# Patient Record
Sex: FEMALE | Race: WHITE | Employment: OTHER | URBAN - NONMETROPOLITAN AREA
[De-identification: names, ages, dates, MRNs, and addresses within clinical notes are randomized per-mention and may not be internally consistent; named-entity substitution may affect disease eponyms.]

---

## 2018-01-01 ENCOUNTER — HOSPITAL ENCOUNTER (OUTPATIENT)
Age: 78
Setting detail: SPECIMEN
Discharge: HOME | End: 2018-06-14
Payer: MEDICARE

## 2018-01-01 ENCOUNTER — APPOINTMENT (OUTPATIENT)
Dept: INTERVENTIONAL RADIOLOGY/VASCULAR | Age: 78
End: 2018-01-01
Payer: MEDICARE

## 2018-01-01 ENCOUNTER — APPOINTMENT (OUTPATIENT)
Dept: ULTRASOUND IMAGING | Age: 78
End: 2018-01-01
Payer: MEDICARE

## 2018-01-01 ENCOUNTER — HOSPITAL ENCOUNTER (EMERGENCY)
Age: 78
Discharge: HOME | End: 2018-06-10
Payer: MEDICARE

## 2018-01-01 ENCOUNTER — OFFICE VISIT (OUTPATIENT)
Dept: NEPHROLOGY | Age: 78
End: 2018-01-01
Payer: MEDICARE

## 2018-01-01 ENCOUNTER — APPOINTMENT (OUTPATIENT)
Dept: GENERAL RADIOLOGY | Age: 78
End: 2018-01-01
Payer: MEDICARE

## 2018-01-01 ENCOUNTER — HOSPITAL ENCOUNTER (OUTPATIENT)
Age: 78
Setting detail: SPECIMEN
Discharge: HOME | End: 2018-07-19
Payer: MEDICARE

## 2018-01-01 ENCOUNTER — TELEPHONE (OUTPATIENT)
Dept: INTERNAL MEDICINE CLINIC | Age: 78
End: 2018-01-01

## 2018-01-01 ENCOUNTER — APPOINTMENT (OUTPATIENT)
Dept: ULTRASOUND IMAGING | Age: 78
DRG: 682 | End: 2018-01-01
Payer: MEDICARE

## 2018-01-01 ENCOUNTER — APPOINTMENT (OUTPATIENT)
Dept: GENERAL RADIOLOGY | Age: 78
DRG: 291 | End: 2018-01-01
Payer: MEDICARE

## 2018-01-01 ENCOUNTER — HOSPITAL ENCOUNTER (OUTPATIENT)
Dept: ULTRASOUND IMAGING | Age: 78
Discharge: HOME | End: 2018-07-24
Payer: MEDICARE

## 2018-01-01 ENCOUNTER — HOSPITAL ENCOUNTER (OUTPATIENT)
Age: 78
Setting detail: SPECIMEN
Discharge: HOME | End: 2018-06-12
Payer: MEDICARE

## 2018-01-01 ENCOUNTER — HOSPITAL ENCOUNTER (OUTPATIENT)
Dept: ULTRASOUND IMAGING | Age: 78
Discharge: HOME | End: 2018-08-07
Payer: MEDICARE

## 2018-01-01 ENCOUNTER — OFFICE VISIT (OUTPATIENT)
Dept: INTERNAL MEDICINE CLINIC | Age: 78
End: 2018-01-01
Payer: MEDICARE

## 2018-01-01 ENCOUNTER — HOSPITAL ENCOUNTER (OUTPATIENT)
Dept: ULTRASOUND IMAGING | Age: 78
Discharge: HOME | End: 2018-08-14
Payer: MEDICARE

## 2018-01-01 ENCOUNTER — HOSPITAL ENCOUNTER (OUTPATIENT)
Dept: ULTRASOUND IMAGING | Age: 78
Discharge: HOME | End: 2018-07-31
Payer: MEDICARE

## 2018-01-01 ENCOUNTER — HOSPITAL ENCOUNTER (OUTPATIENT)
Dept: ULTRASOUND IMAGING | Age: 78
Discharge: HOME | DRG: 291 | End: 2018-07-03
Payer: MEDICARE

## 2018-01-01 ENCOUNTER — TELEPHONE (OUTPATIENT)
Dept: PHARMACY | Age: 78
End: 2018-01-01

## 2018-01-01 ENCOUNTER — HOSPITAL ENCOUNTER (OUTPATIENT)
Dept: ULTRASOUND IMAGING | Age: 78
Discharge: HOME | End: 2018-06-26
Payer: MEDICARE

## 2018-01-01 ENCOUNTER — HOSPITAL ENCOUNTER (INPATIENT)
Age: 78
LOS: 9 days | Discharge: HOME HEALTH | DRG: 682 | End: 2018-05-31
Attending: FAMILY MEDICINE | Admitting: INTERNAL MEDICINE
Payer: MEDICARE

## 2018-01-01 ENCOUNTER — HOSPITAL ENCOUNTER (INPATIENT)
Age: 78
LOS: 11 days | Discharge: HOME | DRG: 291 | End: 2018-07-16
Attending: INTERNAL MEDICINE | Admitting: INTERNAL MEDICINE
Payer: MEDICARE

## 2018-01-01 ENCOUNTER — HOSPITAL ENCOUNTER (OUTPATIENT)
Dept: ULTRASOUND IMAGING | Age: 78
Discharge: HOME | End: 2018-06-19
Payer: MEDICARE

## 2018-01-01 ENCOUNTER — APPOINTMENT (OUTPATIENT)
Dept: ULTRASOUND IMAGING | Age: 78
DRG: 291 | End: 2018-01-01
Payer: MEDICARE

## 2018-01-01 ENCOUNTER — HOSPITAL ENCOUNTER (OUTPATIENT)
Dept: ULTRASOUND IMAGING | Age: 78
Discharge: HOME | End: 2018-08-28
Payer: MEDICARE

## 2018-01-01 ENCOUNTER — TELEPHONE (OUTPATIENT)
Dept: NEPHROLOGY | Age: 78
End: 2018-01-01

## 2018-01-01 ENCOUNTER — APPOINTMENT (OUTPATIENT)
Dept: GENERAL RADIOLOGY | Age: 78
DRG: 682 | End: 2018-01-01
Payer: MEDICARE

## 2018-01-01 ENCOUNTER — APPOINTMENT (OUTPATIENT)
Dept: CT IMAGING | Age: 78
DRG: 682 | End: 2018-01-01
Payer: MEDICARE

## 2018-01-01 ENCOUNTER — HOSPITAL ENCOUNTER (EMERGENCY)
Age: 78
Discharge: HOME | End: 2018-06-28
Attending: FAMILY MEDICINE
Payer: MEDICARE

## 2018-01-01 ENCOUNTER — HOSPITAL ENCOUNTER (EMERGENCY)
Age: 78
Discharge: HOME | End: 2018-08-22
Attending: FAMILY MEDICINE
Payer: MEDICARE

## 2018-01-01 ENCOUNTER — HOSPITAL ENCOUNTER (OUTPATIENT)
Dept: ULTRASOUND IMAGING | Age: 78
Discharge: HOME | End: 2018-08-21
Payer: MEDICARE

## 2018-01-01 ENCOUNTER — HOSPITAL ENCOUNTER (EMERGENCY)
Age: 78
Discharge: HOME | End: 2018-06-05
Payer: MEDICARE

## 2018-01-01 VITALS
HEIGHT: 65 IN | HEART RATE: 62 BPM | SYSTOLIC BLOOD PRESSURE: 130 MMHG | BODY MASS INDEX: 29.16 KG/M2 | WEIGHT: 175 LBS | OXYGEN SATURATION: 97 % | DIASTOLIC BLOOD PRESSURE: 60 MMHG

## 2018-01-01 VITALS
HEIGHT: 65 IN | TEMPERATURE: 97.4 F | OXYGEN SATURATION: 100 % | HEART RATE: 60 BPM | RESPIRATION RATE: 14 BRPM | DIASTOLIC BLOOD PRESSURE: 54 MMHG | SYSTOLIC BLOOD PRESSURE: 121 MMHG | BODY MASS INDEX: 26.66 KG/M2 | WEIGHT: 160 LBS

## 2018-01-01 VITALS
WEIGHT: 223.9 LBS | BODY MASS INDEX: 37.3 KG/M2 | OXYGEN SATURATION: 92 % | RESPIRATION RATE: 20 BRPM | SYSTOLIC BLOOD PRESSURE: 112 MMHG | DIASTOLIC BLOOD PRESSURE: 64 MMHG | TEMPERATURE: 97.4 F | HEART RATE: 64 BPM | HEIGHT: 65 IN

## 2018-01-01 VITALS
DIASTOLIC BLOOD PRESSURE: 53 MMHG | BODY MASS INDEX: 36.49 KG/M2 | HEART RATE: 77 BPM | TEMPERATURE: 98.7 F | OXYGEN SATURATION: 97 % | SYSTOLIC BLOOD PRESSURE: 108 MMHG | RESPIRATION RATE: 18 BRPM | HEIGHT: 65 IN | WEIGHT: 219 LBS

## 2018-01-01 VITALS
HEART RATE: 62 BPM | WEIGHT: 168.4 LBS | OXYGEN SATURATION: 96 % | BODY MASS INDEX: 28.06 KG/M2 | TEMPERATURE: 97.4 F | HEIGHT: 65 IN | RESPIRATION RATE: 18 BRPM | DIASTOLIC BLOOD PRESSURE: 61 MMHG | SYSTOLIC BLOOD PRESSURE: 139 MMHG

## 2018-01-01 VITALS
DIASTOLIC BLOOD PRESSURE: 56 MMHG | SYSTOLIC BLOOD PRESSURE: 131 MMHG | HEART RATE: 63 BPM | RESPIRATION RATE: 16 BRPM | TEMPERATURE: 97.5 F | OXYGEN SATURATION: 98 %

## 2018-01-01 VITALS
WEIGHT: 168 LBS | HEART RATE: 60 BPM | OXYGEN SATURATION: 100 % | BODY MASS INDEX: 27.96 KG/M2 | DIASTOLIC BLOOD PRESSURE: 55 MMHG | RESPIRATION RATE: 18 BRPM | SYSTOLIC BLOOD PRESSURE: 134 MMHG | TEMPERATURE: 97.7 F

## 2018-01-01 VITALS — OXYGEN SATURATION: 97 % | SYSTOLIC BLOOD PRESSURE: 142 MMHG | DIASTOLIC BLOOD PRESSURE: 67 MMHG | HEART RATE: 79 BPM

## 2018-01-01 DIAGNOSIS — E11.22 CKD STAGE 3 DUE TO TYPE 2 DIABETES MELLITUS (HCC): ICD-10-CM

## 2018-01-01 DIAGNOSIS — E87.5 HYPERKALEMIA: ICD-10-CM

## 2018-01-01 DIAGNOSIS — K74.60 CIRRHOSIS OF LIVER WITH ASCITES, UNSPECIFIED HEPATIC CIRRHOSIS TYPE (HCC): ICD-10-CM

## 2018-01-01 DIAGNOSIS — E87.5 HYPERKALEMIA: Primary | ICD-10-CM

## 2018-01-01 DIAGNOSIS — J90 PLEURAL EFFUSION: ICD-10-CM

## 2018-01-01 DIAGNOSIS — R19.7 DIARRHEA, UNSPECIFIED TYPE: Primary | ICD-10-CM

## 2018-01-01 DIAGNOSIS — E11.8 TYPE 2 DIABETES MELLITUS WITH COMPLICATION, UNSPECIFIED LONG TERM INSULIN USE STATUS: ICD-10-CM

## 2018-01-01 DIAGNOSIS — N17.9 ACUTE KIDNEY INJURY (HCC): ICD-10-CM

## 2018-01-01 DIAGNOSIS — K74.60 CIRRHOSIS OF LIVER WITHOUT ASCITES, UNSPECIFIED HEPATIC CIRRHOSIS TYPE (HCC): ICD-10-CM

## 2018-01-01 DIAGNOSIS — R41.82 ALTERED MENTAL STATUS, UNSPECIFIED ALTERED MENTAL STATUS TYPE: Primary | ICD-10-CM

## 2018-01-01 DIAGNOSIS — R18.8 CIRRHOSIS OF LIVER WITH ASCITES, UNSPECIFIED HEPATIC CIRRHOSIS TYPE (HCC): ICD-10-CM

## 2018-01-01 DIAGNOSIS — R18.8 OTHER ASCITES: Primary | ICD-10-CM

## 2018-01-01 DIAGNOSIS — R60.1 ANASARCA: ICD-10-CM

## 2018-01-01 DIAGNOSIS — M79.602 LEFT ARM PAIN: Primary | ICD-10-CM

## 2018-01-01 DIAGNOSIS — R60.1 ANASARCA: Primary | ICD-10-CM

## 2018-01-01 DIAGNOSIS — N18.30 CHRONIC KIDNEY DISEASE, STAGE 3 (HCC): Primary | ICD-10-CM

## 2018-01-01 DIAGNOSIS — N18.30 CKD STAGE 3 DUE TO TYPE 2 DIABETES MELLITUS (HCC): ICD-10-CM

## 2018-01-01 DIAGNOSIS — D64.9 ANEMIA, NORMOCYTIC NORMOCHROMIC: ICD-10-CM

## 2018-01-01 DIAGNOSIS — R18.8 OTHER ASCITES: ICD-10-CM

## 2018-01-01 DIAGNOSIS — N18.4 STAGE 4 CHRONIC KIDNEY DISEASE (HCC): ICD-10-CM

## 2018-01-01 DIAGNOSIS — K74.60 HEPATIC CIRRHOSIS, UNSPECIFIED HEPATIC CIRRHOSIS TYPE, UNSPECIFIED WHETHER ASCITES PRESENT (HCC): ICD-10-CM

## 2018-01-01 DIAGNOSIS — I10 ESSENTIAL HYPERTENSION: ICD-10-CM

## 2018-01-01 DIAGNOSIS — K74.69 FLORID CIRRHOSIS (HCC): ICD-10-CM

## 2018-01-01 DIAGNOSIS — R77.0 ABNORMALITY OF ALBUMIN: ICD-10-CM

## 2018-01-01 LAB
ABO: NORMAL
ABSOLUTE BASO #: 100 /CMM (ref 0–200)
ABSOLUTE EOS #: 200 /CMM (ref 0–500)
ABSOLUTE LYMPH #: 1000 /CMM (ref 1000–4800)
ABSOLUTE MONO #: 800 /CMM (ref 0–800)
ABSOLUTE NEUT #: 4900 /CMM (ref 1800–7700)
AFP-TUMOR MARKER: 2.3 UG/L
ALBUMIN FLUID: 1.3 GM/DL
ALBUMIN FLUID: 1.5 GM/DL
ALBUMIN FLUID: 1.8 GM/DL
ALBUMIN FLUID: 2.3 GM/DL
ALBUMIN FLUID: 2.3 GM/DL
ALBUMIN SERPL-MCNC: 2.5 G/DL (ref 3.5–5.1)
ALBUMIN SERPL-MCNC: 2.7 G/DL (ref 3.5–5.1)
ALBUMIN SERPL-MCNC: 2.8 G/DL (ref 3.5–5.1)
ALBUMIN SERPL-MCNC: 2.8 G/DL (ref 3.5–5.1)
ALBUMIN SERPL-MCNC: 2.9 G/DL (ref 3.5–5.1)
ALBUMIN SERPL-MCNC: 3.1 G/DL (ref 3.5–5.1)
ALBUMIN SERPL-MCNC: 3.5 G/DL (ref 3.5–5.1)
ALBUMIN SERPL-MCNC: 3.7 G/DL (ref 3.5–5.1)
ALLEN TEST: POSITIVE
ALP BLD-CCNC: 136 U/L (ref 38–126)
ALP BLD-CCNC: 153 U/L (ref 38–126)
ALP BLD-CCNC: 154 U/L (ref 38–126)
ALP BLD-CCNC: 195 U/L (ref 38–126)
ALP BLD-CCNC: 261 U/L (ref 38–126)
ALP BLD-CCNC: 61 U/L (ref 38–126)
ALP BLD-CCNC: 78 U/L (ref 38–126)
ALP BLD-CCNC: 82 U/L (ref 38–126)
ALP BLD-CCNC: 96 U/L (ref 38–126)
ALT SERPL-CCNC: 10 U/L (ref 11–66)
ALT SERPL-CCNC: 12 U/L (ref 11–66)
ALT SERPL-CCNC: 16 U/L (ref 11–66)
ALT SERPL-CCNC: 7 U/L (ref 11–66)
ALT SERPL-CCNC: 9 U/L (ref 11–66)
ALT SERPL-CCNC: 9 U/L (ref 11–66)
AMMONIA: 20 UMOL/L (ref 11–60)
AMMONIA: 31 UMOL/L (ref 11–60)
AMMONIA: 54 UMOL/L (ref 11–60)
AMMONIA: 66 UMOL/L (ref 11–60)
AMMONIA: 78 UMOL/L (ref 11–60)
AMMONIA: 92 UMOL/L (ref 11–60)
AMYLASE FLUID: 27 U/L
ANA SCREEN, REFLEXED: ABNORMAL
ANA SCREEN: DETECTED
ANAEROBIC CULTURE: NORMAL
ANION GAP SERPL CALCULATED.3IONS-SCNC: 10 MEQ/L (ref 8–16)
ANION GAP SERPL CALCULATED.3IONS-SCNC: 10 MEQ/L (ref 8–16)
ANION GAP SERPL CALCULATED.3IONS-SCNC: 11 MEQ/L (ref 8–16)
ANION GAP SERPL CALCULATED.3IONS-SCNC: 12 MEQ/L (ref 8–16)
ANION GAP SERPL CALCULATED.3IONS-SCNC: 13 MEQ/L (ref 8–16)
ANION GAP SERPL CALCULATED.3IONS-SCNC: 14 MEQ/L (ref 8–16)
ANION GAP SERPL CALCULATED.3IONS-SCNC: 15 MEQ/L (ref 8–16)
ANION GAP SERPL CALCULATED.3IONS-SCNC: 17 MEQ/L (ref 8–16)
ANION GAP SERPL CALCULATED.3IONS-SCNC: 2 MMOL/L (ref 4–12)
ANION GAP SERPL CALCULATED.3IONS-SCNC: 8 MEQ/L (ref 8–16)
ANION GAP SERPL CALCULATED.3IONS-SCNC: 8 MEQ/L (ref 8–16)
ANISOCYTOSIS: ABNORMAL
ANISOCYTOSIS: PRESENT
ANTIBODY SCREEN: NORMAL
APTT: 33.2 SECONDS (ref 22–38)
AST SERPL-CCNC: 12 U/L (ref 5–40)
AST SERPL-CCNC: 12 U/L (ref 5–40)
AST SERPL-CCNC: 14 U/L (ref 5–40)
AST SERPL-CCNC: 15 U/L (ref 5–40)
AST SERPL-CCNC: 16 U/L (ref 5–40)
AST SERPL-CCNC: 16 U/L (ref 5–40)
AST SERPL-CCNC: 19 U/L (ref 5–40)
AST SERPL-CCNC: 19 U/L (ref 5–40)
AST SERPL-CCNC: 25 U/L (ref 5–40)
AVERAGE GLUCOSE: 192 MG/DL (ref 70–126)
BACTERIA: ABNORMAL /HPF
BASE EXCESS (CALCULATED): 12.6 MMOL/L (ref -2.5–2.5)
BASO FLUID: 1 % (ref 0–3)
BASOPHILS # BLD: 0.1 %
BASOPHILS # BLD: 0.2 %
BASOPHILS # BLD: 0.4 %
BASOPHILS # BLD: 0.4 %
BASOPHILS # BLD: 0.5 %
BASOPHILS ABSOLUTE: 0 THOU/MM3 (ref 0–0.1)
BASOPHILS ABSOLUTE: ABNORMAL /ΜL
BASOPHILS RELATIVE PERCENT: 0.8 % (ref 0–2)
BASOPHILS RELATIVE PERCENT: ABNORMAL %
BILIRUB SERPL-MCNC: 0.5 MG/DL (ref 0.3–1.2)
BILIRUB SERPL-MCNC: 0.6 MG/DL (ref 0.3–1.2)
BILIRUB SERPL-MCNC: 0.8 MG/DL (ref 0.3–1.2)
BILIRUB SERPL-MCNC: 1 MG/DL (ref 0.3–1.2)
BILIRUB SERPL-MCNC: 1.2 MG/DL (ref 0.3–1.2)
BILIRUB SERPL-MCNC: 1.4 MG/DL (ref 0.3–1.2)
BILIRUB SERPL-MCNC: 1.4 MG/DL (ref 0.3–1.2)
BILIRUB SERPL-MCNC: 1.5 MG/DL (ref 0.3–1.2)
BILIRUB SERPL-MCNC: 1.7 MG/DL (ref 0.3–1.2)
BILIRUBIN DIRECT: 0.3 MG/DL (ref 0–0.3)
BILIRUBIN DIRECT: 0.5 MG/DL (ref 0–0.3)
BILIRUBIN DIRECT: 0.8 MG/DL (ref 0–0.3)
BILIRUBIN DIRECT: 0.9 MG/DL (ref 0–0.3)
BILIRUBIN DIRECT: 0.9 MG/DL (ref 0–0.3)
BILIRUBIN URINE: NEGATIVE
BLOOD CULTURE, ROUTINE: NORMAL
BLOOD, URINE: NEGATIVE
BODY FLUID CULTURE, STERILE: NORMAL
BODY FLUID RBC: 2000 /CUMM
BODY FLUID RBC: 3000 /CUMM
BODY FLUID RBC: 7000 /CUMM
BODY FLUID RBC: < 2000 /CUMM
BUN BLDV-MCNC: 101 MG/DL (ref 7–22)
BUN BLDV-MCNC: 106 MG/DL (ref 7–22)
BUN BLDV-MCNC: 109 MG/DL (ref 7–22)
BUN BLDV-MCNC: 30 MG/DL (ref 7–22)
BUN BLDV-MCNC: 34 MG/DL (ref 7–22)
BUN BLDV-MCNC: 34 MG/DL (ref 7–22)
BUN BLDV-MCNC: 35 MG/DL (ref 7–22)
BUN BLDV-MCNC: 36 MG/DL (ref 7–22)
BUN BLDV-MCNC: 37 MG/DL (ref 7–20)
BUN BLDV-MCNC: 41 MG/DL
BUN BLDV-MCNC: 45 MG/DL (ref 7–22)
BUN BLDV-MCNC: 46 MG/DL (ref 7–22)
BUN BLDV-MCNC: 46 MG/DL (ref 7–22)
BUN BLDV-MCNC: 52 MG/DL (ref 7–22)
BUN BLDV-MCNC: 55 MG/DL (ref 7–22)
BUN BLDV-MCNC: 57 MG/DL (ref 7–22)
BUN BLDV-MCNC: 58 MG/DL (ref 7–22)
BUN BLDV-MCNC: 58 MG/DL (ref 7–22)
BUN BLDV-MCNC: 59 MG/DL (ref 7–22)
BUN BLDV-MCNC: 63 MG/DL (ref 7–22)
BUN BLDV-MCNC: 65 MG/DL (ref 7–22)
BUN BLDV-MCNC: 69 MG/DL (ref 7–22)
BUN BLDV-MCNC: 72 MG/DL (ref 7–22)
BUN BLDV-MCNC: 72 MG/DL (ref 7–22)
BUN BLDV-MCNC: 78 MG/DL (ref 7–22)
BUN BLDV-MCNC: 88 MG/DL (ref 7–22)
BUN BLDV-MCNC: 90 MG/DL (ref 7–22)
BUN BLDV-MCNC: 91 MG/DL (ref 7–22)
BUN BLDV-MCNC: 93 MG/DL (ref 7–22)
BUN BLDV-MCNC: 97 MG/DL (ref 7–22)
BUN BLDV-MCNC: 98 MG/DL (ref 7–22)
CA 125: 586 U/ML
CALCIUM SERPL-MCNC: 7.8 MG/DL (ref 8.5–10.5)
CALCIUM SERPL-MCNC: 8 MG/DL (ref 8.5–10.5)
CALCIUM SERPL-MCNC: 8 MG/DL (ref 8.8–10.5)
CALCIUM SERPL-MCNC: 8.1 MG/DL (ref 8.5–10.5)
CALCIUM SERPL-MCNC: 8.1 MG/DL (ref 8.5–10.5)
CALCIUM SERPL-MCNC: 8.2 MG/DL (ref 8.5–10.5)
CALCIUM SERPL-MCNC: 8.2 MG/DL (ref 8.5–10.5)
CALCIUM SERPL-MCNC: 8.3 MG/DL
CALCIUM SERPL-MCNC: 8.3 MG/DL (ref 8.5–10.5)
CALCIUM SERPL-MCNC: 8.4 MG/DL (ref 8.5–10.5)
CALCIUM SERPL-MCNC: 8.5 MG/DL (ref 8.5–10.5)
CALCIUM SERPL-MCNC: 8.6 MG/DL (ref 8.5–10.5)
CALCIUM SERPL-MCNC: 8.7 MG/DL (ref 8.5–10.5)
CALCIUM SERPL-MCNC: 8.8 MG/DL (ref 8.5–10.5)
CALCIUM SERPL-MCNC: 8.9 MG/DL (ref 8.5–10.5)
CASTS 2: ABNORMAL /LPF
CASTS UA: ABNORMAL /LPF
CEA: 3.6 NG/ML (ref 0–5)
CHARACTER, BODY FLUID: ABNORMAL
CHARACTER, BODY FLUID: NORMAL
CHARACTER, URINE: ABNORMAL
CHARACTER, URINE: ABNORMAL
CHARACTER, URINE: CLEAR
CHLORIDE BLD-SCNC: 100 MEQ/L (ref 98–111)
CHLORIDE BLD-SCNC: 101 MEQ/L (ref 98–111)
CHLORIDE BLD-SCNC: 102 MEQ/L (ref 98–111)
CHLORIDE BLD-SCNC: 102 MEQ/L (ref 98–111)
CHLORIDE BLD-SCNC: 102 MMOL/L
CHLORIDE BLD-SCNC: 105 MEQ/L (ref 98–111)
CHLORIDE BLD-SCNC: 107 MEQ/L (ref 101–111)
CHLORIDE BLD-SCNC: 107 MEQ/L (ref 98–111)
CHLORIDE BLD-SCNC: 109 MEQ/L (ref 98–111)
CHLORIDE BLD-SCNC: 82 MEQ/L (ref 98–111)
CHLORIDE BLD-SCNC: 87 MEQ/L (ref 98–111)
CHLORIDE BLD-SCNC: 88 MEQ/L (ref 98–111)
CHLORIDE BLD-SCNC: 88 MEQ/L (ref 98–111)
CHLORIDE BLD-SCNC: 89 MEQ/L (ref 98–111)
CHLORIDE BLD-SCNC: 89 MEQ/L (ref 98–111)
CHLORIDE BLD-SCNC: 90 MEQ/L (ref 98–111)
CHLORIDE BLD-SCNC: 90 MEQ/L (ref 98–111)
CHLORIDE BLD-SCNC: 91 MEQ/L (ref 98–111)
CHLORIDE BLD-SCNC: 92 MEQ/L (ref 98–111)
CHLORIDE BLD-SCNC: 92 MEQ/L (ref 98–111)
CHLORIDE BLD-SCNC: 93 MEQ/L (ref 98–111)
CHLORIDE BLD-SCNC: 93 MEQ/L (ref 98–111)
CHLORIDE BLD-SCNC: 95 MEQ/L (ref 98–111)
CHLORIDE BLD-SCNC: 97 MEQ/L (ref 98–111)
CHLORIDE BLD-SCNC: 97 MEQ/L (ref 98–111)
CHLORIDE, URINE: 31.8 MEQ/L
CHP ED QC CHECK: YES
CO2: 16 MEQ/L (ref 23–33)
CO2: 17 MEQ/L (ref 23–33)
CO2: 19 MEQ/L (ref 23–33)
CO2: 19 MEQ/L (ref 23–33)
CO2: 20 MEQ/L (ref 23–33)
CO2: 20 MEQ/L (ref 23–33)
CO2: 22 MEQ/L (ref 21–32)
CO2: 22 MEQ/L (ref 23–33)
CO2: 24 MEQ/L (ref 23–33)
CO2: 24 MEQ/L (ref 23–33)
CO2: 25 MEQ/L (ref 23–33)
CO2: 26 MEQ/L (ref 23–33)
CO2: 27 MEQ/L (ref 23–33)
CO2: 28 MMOL/L
CO2: 29 MEQ/L (ref 23–33)
CO2: 31 MEQ/L (ref 23–33)
CO2: 33 MEQ/L (ref 23–33)
CO2: 34 MEQ/L (ref 23–33)
CO2: 36 MEQ/L (ref 23–33)
CO2: 36 MEQ/L (ref 23–33)
CO2: 37 MEQ/L (ref 23–33)
CO2: 38 MEQ/L (ref 23–33)
COLLECTED BY:: 4648
COLOR: ABNORMAL
COLOR: ABNORMAL
COLOR: YELLOW
CREAT SERPL-MCNC: 1.1 MG/DL (ref 0.4–1.2)
CREAT SERPL-MCNC: 1.12 MG/DL (ref 0.6–1.3)
CREAT SERPL-MCNC: 1.2 MG/DL (ref 0.4–1.2)
CREAT SERPL-MCNC: 1.3 MG/DL (ref 0.4–1.2)
CREAT SERPL-MCNC: 1.4 MG/DL
CREAT SERPL-MCNC: 1.4 MG/DL (ref 0.4–1.2)
CREAT SERPL-MCNC: 1.5 MG/DL (ref 0.4–1.2)
CREAT SERPL-MCNC: 1.6 MG/DL (ref 0.4–1.2)
CREAT SERPL-MCNC: 1.7 MG/DL (ref 0.4–1.2)
CREAT SERPL-MCNC: 1.8 MG/DL (ref 0.4–1.2)
CREAT SERPL-MCNC: 1.9 MG/DL (ref 0.4–1.2)
CREAT SERPL-MCNC: 2 MG/DL (ref 0.4–1.2)
CREAT SERPL-MCNC: 2.1 MG/DL (ref 0.4–1.2)
CREAT SERPL-MCNC: 2.1 MG/DL (ref 0.4–1.2)
CREAT SERPL-MCNC: 2.3 MG/DL (ref 0.4–1.2)
CREAT SERPL-MCNC: 2.5 MG/DL (ref 0.4–1.2)
CREAT SERPL-MCNC: 2.6 MG/DL (ref 0.4–1.2)
CREAT SERPL-MCNC: 2.7 MG/DL (ref 0.4–1.2)
CREATININE CLEARANCE: 47
CREATININE URINE: 49 MG/DL
CRENATED RBC'S: ABNORMAL
CRYSTALS, UA: ABNORMAL
CYCLIC CITRULLIN PEPTIDE AB: 2 UNITS (ref 0–19)
DEVICE: ABNORMAL
DIGOXIN LEVEL: 0.8 NG/ML (ref 0.5–2)
DIGOXIN LEVEL: 1.2 NG/ML (ref 0.5–2)
DIGOXIN LEVEL: 1.8 NG/ML (ref 0.5–2)
DIGOXIN LEVEL: 2.3 NG/ML (ref 0.5–2)
EKG ATRIAL RATE: 125 BPM
EKG ATRIAL RATE: 241 BPM
EKG ATRIAL RATE: 60 BPM
EKG ATRIAL RATE: 60 BPM
EKG ATRIAL RATE: 62 BPM
EKG ATRIAL RATE: 87 BPM
EKG P AXIS: 69 DEGREES
EKG P AXIS: 72 DEGREES
EKG P AXIS: 79 DEGREES
EKG P-R INTERVAL: 188 MS
EKG P-R INTERVAL: 268 MS
EKG P-R INTERVAL: 272 MS
EKG Q-T INTERVAL: 390 MS
EKG Q-T INTERVAL: 434 MS
EKG Q-T INTERVAL: 438 MS
EKG Q-T INTERVAL: 440 MS
EKG Q-T INTERVAL: 456 MS
EKG Q-T INTERVAL: 470 MS
EKG QRS DURATION: 124 MS
EKG QRS DURATION: 130 MS
EKG QRS DURATION: 134 MS
EKG QRS DURATION: 196 MS
EKG QTC CALCULATION (BAZETT): 440 MS
EKG QTC CALCULATION (BAZETT): 444 MS
EKG QTC CALCULATION (BAZETT): 456 MS
EKG QTC CALCULATION (BAZETT): 458 MS
EKG QTC CALCULATION (BAZETT): 479 MS
EKG QTC CALCULATION (BAZETT): 552 MS
EKG R AXIS: -60 DEGREES
EKG R AXIS: -69 DEGREES
EKG R AXIS: -70 DEGREES
EKG R AXIS: -75 DEGREES
EKG R AXIS: -77 DEGREES
EKG R AXIS: -85 DEGREES
EKG T AXIS: 104 DEGREES
EKG T AXIS: 104 DEGREES
EKG T AXIS: 109 DEGREES
EKG T AXIS: 78 DEGREES
EKG T AXIS: 91 DEGREES
EKG T AXIS: 99 DEGREES
EKG VENTRICULAR RATE: 60 BPM
EKG VENTRICULAR RATE: 60 BPM
EKG VENTRICULAR RATE: 62 BPM
EKG VENTRICULAR RATE: 67 BPM
EKG VENTRICULAR RATE: 83 BPM
EKG VENTRICULAR RATE: 91 BPM
ELLIPTOCYTES: ABNORMAL
EOSINOPHIL # BLD: 0 %
EOSINOPHIL # BLD: 0 %
EOSINOPHIL # BLD: 0.5 %
EOSINOPHIL # BLD: 2 %
EOSINOPHIL # BLD: 2.8 %
EOSINOPHIL # BLD: 3.7 %
EOSINOPHIL # BLD: 3.7 %
EOSINOPHIL SMEAR: NORMAL
EOSINOPHILS ABSOLUTE: 0 THOU/MM3 (ref 0–0.4)
EOSINOPHILS ABSOLUTE: 0.1 THOU/MM3 (ref 0–0.4)
EOSINOPHILS ABSOLUTE: 0.2 THOU/MM3 (ref 0–0.4)
EOSINOPHILS ABSOLUTE: 0.3 THOU/MM3 (ref 0–0.4)
EOSINOPHILS ABSOLUTE: 0.3 THOU/MM3 (ref 0–0.4)
EOSINOPHILS ABSOLUTE: ABNORMAL /ΜL
EOSINOPHILS RELATIVE PERCENT: 2.9 % (ref 0–6)
EOSINOPHILS RELATIVE PERCENT: ABNORMAL %
EPITHELIAL CELLS, UA: ABNORMAL /HPF
ERYTHROCYTE [DISTWIDTH] IN BLOOD BY AUTOMATED COUNT: 18.5 % (ref 11.5–14.5)
ERYTHROCYTE [DISTWIDTH] IN BLOOD BY AUTOMATED COUNT: 20.3 % (ref 11.5–14.5)
ERYTHROCYTE [DISTWIDTH] IN BLOOD BY AUTOMATED COUNT: 20.7 % (ref 11.5–14.5)
ERYTHROCYTE [DISTWIDTH] IN BLOOD BY AUTOMATED COUNT: 21.4 % (ref 11.5–14.5)
ERYTHROCYTE [DISTWIDTH] IN BLOOD BY AUTOMATED COUNT: 21.5 % (ref 11.5–14.5)
ERYTHROCYTE [DISTWIDTH] IN BLOOD BY AUTOMATED COUNT: 21.7 % (ref 11.5–14.5)
ERYTHROCYTE [DISTWIDTH] IN BLOOD BY AUTOMATED COUNT: 21.7 % (ref 11.5–14.5)
ERYTHROCYTE [DISTWIDTH] IN BLOOD BY AUTOMATED COUNT: 59.2 FL (ref 35–45)
ERYTHROCYTE [DISTWIDTH] IN BLOOD BY AUTOMATED COUNT: 65.9 FL (ref 35–45)
ERYTHROCYTE [DISTWIDTH] IN BLOOD BY AUTOMATED COUNT: 68.9 FL (ref 35–45)
ERYTHROCYTE [DISTWIDTH] IN BLOOD BY AUTOMATED COUNT: 69.7 FL (ref 35–45)
ERYTHROCYTE [DISTWIDTH] IN BLOOD BY AUTOMATED COUNT: 72.5 FL (ref 35–45)
ERYTHROCYTE [DISTWIDTH] IN BLOOD BY AUTOMATED COUNT: 75.2 FL (ref 35–45)
ERYTHROCYTE [DISTWIDTH] IN BLOOD BY AUTOMATED COUNT: 75.7 FL (ref 35–45)
F-ACTIN AB IGG: 12 UNITS (ref 0–19)
GFR CALCULATED: 36
GFR SERPL CREATININE-BSD FRML MDRD: 17 ML/MIN/1.73M2
GFR SERPL CREATININE-BSD FRML MDRD: 18 ML/MIN/1.73M2
GFR SERPL CREATININE-BSD FRML MDRD: 19 ML/MIN/1.73M2
GFR SERPL CREATININE-BSD FRML MDRD: 20 ML/MIN/1.73M2
GFR SERPL CREATININE-BSD FRML MDRD: 23 ML/MIN/1.73M2
GFR SERPL CREATININE-BSD FRML MDRD: 23 ML/MIN/1.73M2
GFR SERPL CREATININE-BSD FRML MDRD: 24 ML/MIN/1.73M2
GFR SERPL CREATININE-BSD FRML MDRD: 25 ML/MIN/1.73M2
GFR SERPL CREATININE-BSD FRML MDRD: 27 ML/MIN/1.73M2
GFR SERPL CREATININE-BSD FRML MDRD: 29 ML/MIN/1.73M2
GFR SERPL CREATININE-BSD FRML MDRD: 31 ML/MIN/1.73M2
GFR SERPL CREATININE-BSD FRML MDRD: 33 ML/MIN/1.73M2
GFR SERPL CREATININE-BSD FRML MDRD: 36 ML/MIN/1.73M2
GFR SERPL CREATININE-BSD FRML MDRD: 39 ML/MIN/1.73M2
GFR SERPL CREATININE-BSD FRML MDRD: 43 ML/MIN/1.73M2
GFR SERPL CREATININE-BSD FRML MDRD: 48 ML/MIN/1.73M2
GLUCOSE BLD-MCNC: 109 MG/DL (ref 70–108)
GLUCOSE BLD-MCNC: 111 MG/DL (ref 70–108)
GLUCOSE BLD-MCNC: 117 MG/DL (ref 70–108)
GLUCOSE BLD-MCNC: 118 MG/DL (ref 70–108)
GLUCOSE BLD-MCNC: 121 MG/DL (ref 70–108)
GLUCOSE BLD-MCNC: 121 MG/DL (ref 70–108)
GLUCOSE BLD-MCNC: 122 MG/DL (ref 70–108)
GLUCOSE BLD-MCNC: 125 MG/DL (ref 70–108)
GLUCOSE BLD-MCNC: 133 MG/DL (ref 70–108)
GLUCOSE BLD-MCNC: 136 MG/DL (ref 70–108)
GLUCOSE BLD-MCNC: 138 MG/DL (ref 70–108)
GLUCOSE BLD-MCNC: 138 MG/DL (ref 70–108)
GLUCOSE BLD-MCNC: 146 MG/DL (ref 70–108)
GLUCOSE BLD-MCNC: 151 MG/DL (ref 70–108)
GLUCOSE BLD-MCNC: 156 MG/DL (ref 70–108)
GLUCOSE BLD-MCNC: 157 MG/DL (ref 70–108)
GLUCOSE BLD-MCNC: 158 MG/DL (ref 70–108)
GLUCOSE BLD-MCNC: 161 MG/DL (ref 70–108)
GLUCOSE BLD-MCNC: 162 MG/DL (ref 70–108)
GLUCOSE BLD-MCNC: 163 MG/DL (ref 70–108)
GLUCOSE BLD-MCNC: 163 MG/DL (ref 70–108)
GLUCOSE BLD-MCNC: 164 MG/DL (ref 70–108)
GLUCOSE BLD-MCNC: 165 MG/DL (ref 70–108)
GLUCOSE BLD-MCNC: 168 MG/DL (ref 70–108)
GLUCOSE BLD-MCNC: 168 MG/DL (ref 70–108)
GLUCOSE BLD-MCNC: 172 MG/DL (ref 70–108)
GLUCOSE BLD-MCNC: 175 MG/DL (ref 70–108)
GLUCOSE BLD-MCNC: 175 MG/DL (ref 70–108)
GLUCOSE BLD-MCNC: 176 MG/DL (ref 70–108)
GLUCOSE BLD-MCNC: 177 MG/DL (ref 70–108)
GLUCOSE BLD-MCNC: 177 MG/DL (ref 70–108)
GLUCOSE BLD-MCNC: 178 MG/DL
GLUCOSE BLD-MCNC: 182 MG/DL (ref 70–108)
GLUCOSE BLD-MCNC: 183 MG/DL (ref 70–108)
GLUCOSE BLD-MCNC: 184 MG/DL (ref 70–108)
GLUCOSE BLD-MCNC: 184 MG/DL (ref 70–108)
GLUCOSE BLD-MCNC: 185 MG/DL (ref 70–108)
GLUCOSE BLD-MCNC: 186 MG/DL (ref 70–108)
GLUCOSE BLD-MCNC: 187 MG/DL (ref 70–108)
GLUCOSE BLD-MCNC: 187 MG/DL (ref 70–108)
GLUCOSE BLD-MCNC: 188 MG/DL (ref 70–108)
GLUCOSE BLD-MCNC: 190 MG/DL (ref 70–108)
GLUCOSE BLD-MCNC: 191 MG/DL (ref 70–108)
GLUCOSE BLD-MCNC: 191 MG/DL (ref 70–108)
GLUCOSE BLD-MCNC: 192 MG/DL (ref 70–108)
GLUCOSE BLD-MCNC: 193 MG/DL (ref 70–108)
GLUCOSE BLD-MCNC: 195 MG/DL (ref 70–108)
GLUCOSE BLD-MCNC: 197 MG/DL
GLUCOSE BLD-MCNC: 198 MG/DL (ref 70–108)
GLUCOSE BLD-MCNC: 202 MG/DL (ref 70–108)
GLUCOSE BLD-MCNC: 205 MG/DL (ref 70–108)
GLUCOSE BLD-MCNC: 206 MG/DL (ref 70–108)
GLUCOSE BLD-MCNC: 207 MG/DL (ref 70–108)
GLUCOSE BLD-MCNC: 210 MG/DL (ref 70–108)
GLUCOSE BLD-MCNC: 210 MG/DL (ref 70–108)
GLUCOSE BLD-MCNC: 212 MG/DL (ref 70–108)
GLUCOSE BLD-MCNC: 213 MG/DL (ref 70–108)
GLUCOSE BLD-MCNC: 213 MG/DL (ref 70–108)
GLUCOSE BLD-MCNC: 214 MG/DL (ref 70–108)
GLUCOSE BLD-MCNC: 215 MG/DL (ref 70–108)
GLUCOSE BLD-MCNC: 217 MG/DL (ref 70–108)
GLUCOSE BLD-MCNC: 220 MG/DL (ref 70–108)
GLUCOSE BLD-MCNC: 222 MG/DL (ref 70–108)
GLUCOSE BLD-MCNC: 224 MG/DL (ref 70–108)
GLUCOSE BLD-MCNC: 224 MG/DL (ref 70–108)
GLUCOSE BLD-MCNC: 231 MG/DL (ref 70–108)
GLUCOSE BLD-MCNC: 233 MG/DL (ref 70–108)
GLUCOSE BLD-MCNC: 235 MG/DL (ref 70–108)
GLUCOSE BLD-MCNC: 237 MG/DL (ref 70–108)
GLUCOSE BLD-MCNC: 238 MG/DL (ref 70–108)
GLUCOSE BLD-MCNC: 244 MG/DL (ref 70–108)
GLUCOSE BLD-MCNC: 246 MG/DL (ref 70–108)
GLUCOSE BLD-MCNC: 251 MG/DL (ref 70–108)
GLUCOSE BLD-MCNC: 253 MG/DL (ref 70–108)
GLUCOSE BLD-MCNC: 259 MG/DL (ref 70–108)
GLUCOSE BLD-MCNC: 262 MG/DL (ref 70–108)
GLUCOSE BLD-MCNC: 268 MG/DL (ref 70–108)
GLUCOSE BLD-MCNC: 269 MG/DL (ref 70–108)
GLUCOSE BLD-MCNC: 270 MG/DL (ref 70–108)
GLUCOSE BLD-MCNC: 271 MG/DL (ref 70–108)
GLUCOSE BLD-MCNC: 276 MG/DL (ref 70–108)
GLUCOSE BLD-MCNC: 276 MG/DL (ref 70–108)
GLUCOSE BLD-MCNC: 280 MG/DL (ref 70–108)
GLUCOSE BLD-MCNC: 286 MG/DL (ref 70–108)
GLUCOSE BLD-MCNC: 289 MG/DL (ref 70–108)
GLUCOSE BLD-MCNC: 300 MG/DL (ref 70–108)
GLUCOSE BLD-MCNC: 305 MG/DL (ref 70–108)
GLUCOSE BLD-MCNC: 306 MG/DL (ref 70–108)
GLUCOSE BLD-MCNC: 317 MG/DL (ref 70–108)
GLUCOSE BLD-MCNC: 323 MG/DL (ref 70–108)
GLUCOSE BLD-MCNC: 324 MG/DL
GLUCOSE BLD-MCNC: 324 MG/DL (ref 70–108)
GLUCOSE BLD-MCNC: 359 MG/DL (ref 70–108)
GLUCOSE BLD-MCNC: 360 MG/DL (ref 70–108)
GLUCOSE BLD-MCNC: 401 MG/DL (ref 70–108)
GLUCOSE BLD-MCNC: 64 MG/DL (ref 70–108)
GLUCOSE BLD-MCNC: 64 MG/DL (ref 70–108)
GLUCOSE BLD-MCNC: 68 MG/DL (ref 70–108)
GLUCOSE BLD-MCNC: 69 MG/DL (ref 70–108)
GLUCOSE BLD-MCNC: 72 MG/DL (ref 70–108)
GLUCOSE BLD-MCNC: 73 MG/DL (ref 70–108)
GLUCOSE BLD-MCNC: 76 MG/DL (ref 70–108)
GLUCOSE BLD-MCNC: 80 MG/DL (ref 70–108)
GLUCOSE BLD-MCNC: 85 MG/DL (ref 70–108)
GLUCOSE BLD-MCNC: 86 MG/DL (ref 70–108)
GLUCOSE BLD-MCNC: 94 MG/DL (ref 70–108)
GLUCOSE BLD-MCNC: 97 MG/DL (ref 70–108)
GLUCOSE BLD-MCNC: 98 MG/DL (ref 70–108)
GLUCOSE URINE: NEGATIVE MG/DL
GLUCOSE: 56 MG/DL (ref 70–110)
GRAM STAIN RESULT: NORMAL
HAV AB SERPL IA-ACNC: NEGATIVE
HBA1C MFR BLD: 8.4 % (ref 4.4–6.4)
HBA1C MFR BLD: 9.2 % (ref 4.3–5.7)
HBV SURFACE AB TITR SER: NEGATIVE {TITER}
HCO3: 38 MMOL/L (ref 23–28)
HCT VFR BLD CALC: 28.7 % (ref 37–47)
HCT VFR BLD CALC: 29.4 % (ref 37–47)
HCT VFR BLD CALC: 30.1 % (ref 37–47)
HCT VFR BLD CALC: 30.2 % (ref 37–47)
HCT VFR BLD CALC: 30.4 % (ref 36–46)
HCT VFR BLD CALC: 30.4 % (ref 37–47)
HCT VFR BLD CALC: 31.3 % (ref 37–47)
HCT VFR BLD CALC: 31.4 % (ref 37–47)
HCT VFR BLD CALC: 31.5 % (ref 37–47)
HCT VFR BLD CALC: 32.1 % (ref 37–47)
HCT VFR BLD CALC: 32.6 % (ref 37–47)
HCT VFR BLD CALC: 32.9 % (ref 35–44)
HCT VFR BLD CALC: 34.3 % (ref 37–47)
HCT VFR BLD CALC: 35.5 % (ref 37–47)
HCT VFR BLD CALC: 38.3 % (ref 37–47)
HEMOCCULT STL QL: POSITIVE
HEMOGLOBIN: 10.1 GM/DL (ref 12–16)
HEMOGLOBIN: 10.3 GM/DL (ref 12–16)
HEMOGLOBIN: 10.6 GM/DL (ref 12–15)
HEMOGLOBIN: 11.1 GM/DL (ref 12–16)
HEMOGLOBIN: 11.4 GM/DL (ref 12–16)
HEMOGLOBIN: 12.6 GM/DL (ref 12–16)
HEMOGLOBIN: 9.2 GM/DL (ref 12–16)
HEMOGLOBIN: 9.4 GM/DL (ref 12–16)
HEMOGLOBIN: 9.5 GM/DL (ref 12–16)
HEMOGLOBIN: 9.5 GM/DL (ref 12–16)
HEMOGLOBIN: 9.7 G/DL (ref 12–16)
HEMOGLOBIN: 9.8 GM/DL (ref 12–16)
HEMOGLOBIN: 9.9 GM/DL (ref 12–16)
HEPATITIS B CORE TOTAL ANTIBODY: NEGATIVE
HEPATITIS B SURFACE ANTIGEN: NEGATIVE
HEPATITIS C ANTIBODY: NEGATIVE
HYPOCHROMIA: PRESENT
IFIO2: 3
IMMATURE GRANS (ABS): 0.01 THOU/MM3 (ref 0–0.07)
IMMATURE GRANS (ABS): 0.03 THOU/MM3 (ref 0–0.07)
IMMATURE GRANS (ABS): 0.04 THOU/MM3 (ref 0–0.07)
IMMATURE GRANS (ABS): 0.05 THOU/MM3 (ref 0–0.07)
IMMATURE GRANULOCYTES: 0.2 %
IMMATURE GRANULOCYTES: 0.4 %
IMMATURE GRANULOCYTES: 0.5 %
IMMATURE GRANULOCYTES: 0.6 %
INR BLD: 1.25 (ref 0.85–1.13)
INR BLD: 1.26 (ref 0.85–1.13)
INR BLD: 1.29 (ref 0.85–1.13)
INR BLD: 1.32 (ref 0.85–1.13)
INR BLD: 1.35 (ref 0.85–1.13)
INR BLD: 1.36 (ref 0.85–1.13)
INR BLD: 1.39 (ref 0.85–1.13)
INR BLD: 1.42 (ref 0.85–1.13)
INR BLD: 1.42 (ref 0.85–1.13)
INR BLD: 1.48 (ref 0.85–1.13)
INR BLD: 1.54 (ref 0.85–1.13)
INR BLD: 1.54 (ref 0.85–1.13)
INR BLD: 1.84 (ref 0.85–1.13)
INTERPRETATION: ABNORMAL
INTERPRETATION: NORMAL
KETONES, URINE: NEGATIVE
LACTIC ACID: 1.2 MMOL/L (ref 0.5–2.2)
LACTIC ACID: 1.3 MMOL/L (ref 0.5–2.2)
LACTIC ACID: 1.7 MMOL/L (ref 0.5–2.2)
LEUKOCYTE ESTERASE, URINE: ABNORMAL
LEUKOCYTE ESTERASE, URINE: ABNORMAL
LEUKOCYTE ESTERASE, URINE: NEGATIVE
LIPASE: 24.6 U/L (ref 5.6–51.3)
LIPASE: 81.7 U/L (ref 5.6–51.3)
LV EF: 15 %
LVEF MODALITY: NORMAL
LYMPHOCYTES # BLD: 11.9 %
LYMPHOCYTES # BLD: 14 %
LYMPHOCYTES # BLD: 14.5 %
LYMPHOCYTES # BLD: 15.3 %
LYMPHOCYTES # BLD: 15.8 %
LYMPHOCYTES # BLD: 20.2 %
LYMPHOCYTES # BLD: 21.2 %
LYMPHOCYTES ABSOLUTE: 1.1 THOU/MM3 (ref 1–4.8)
LYMPHOCYTES ABSOLUTE: 1.1 THOU/MM3 (ref 1–4.8)
LYMPHOCYTES ABSOLUTE: 1.2 THOU/MM3 (ref 1–4.8)
LYMPHOCYTES ABSOLUTE: 1.4 THOU/MM3 (ref 1–4.8)
LYMPHOCYTES ABSOLUTE: ABNORMAL /ΜL
LYMPHOCYTES RELATIVE PERCENT: 14.1 % (ref 15–45)
LYMPHOCYTES RELATIVE PERCENT: ABNORMAL %
LYMPHOCYTES, BODY FLUID: 15 % (ref 25–100)
LYMPHOCYTES, BODY FLUID: 23 % (ref 25–100)
LYMPHOCYTES, BODY FLUID: 33 % (ref 25–100)
LYMPHOCYTES, BODY FLUID: 55 % (ref 25–100)
LYMPHOCYTES, BODY FLUID: 56 % (ref 25–100)
MAGNESIUM: 2 MG/DL (ref 1.6–2.4)
MAGNESIUM: 2 MG/DL (ref 1.6–2.4)
MAGNESIUM: 2.1 MG/DL (ref 1.6–2.4)
MAGNESIUM: 2.1 MG/DL (ref 1.6–2.4)
MAGNESIUM: 2.4 MG/DL (ref 1.6–2.4)
MAGNESIUM: 2.5 MG/DL (ref 1.6–2.4)
MAGNESIUM: 2.6 MG/DL (ref 1.6–2.4)
MAGNESIUM: 2.7 MG/DL (ref 1.6–2.4)
MAGNESIUM: 2.7 MG/DL (ref 1.6–2.4)
MCH RBC QN AUTO: 27.7 PG (ref 27–31)
MCH RBC QN AUTO: 27.9 PG (ref 26–33)
MCH RBC QN AUTO: 28.2 PG (ref 27–31)
MCH RBC QN AUTO: 28.4 PG (ref 27–31)
MCH RBC QN AUTO: 28.6 PG (ref 27–31)
MCH RBC QN AUTO: 28.7 PG (ref 26–33)
MCH RBC QN AUTO: 28.8 PG (ref 26–33)
MCH RBC QN AUTO: 28.8 PG (ref 26–33)
MCH RBC QN AUTO: 29.1 PG (ref 26–33)
MCH RBC QN AUTO: 29.1 PG (ref 26–33)
MCH RBC QN AUTO: 29.1 PG (ref 27.5–33)
MCH RBC QN AUTO: 29.4 PG (ref 26–33)
MCH RBC QN AUTO: ABNORMAL PG
MCHC RBC AUTO-ENTMCNC: 29.4 GM/DL (ref 32.2–35.5)
MCHC RBC AUTO-ENTMCNC: 30.4 GM/DL (ref 32.2–35.5)
MCHC RBC AUTO-ENTMCNC: 31.5 GM/DL (ref 32.2–35.5)
MCHC RBC AUTO-ENTMCNC: 31.5 GM/DL (ref 32.2–35.5)
MCHC RBC AUTO-ENTMCNC: 31.6 GM/DL (ref 32.2–35.5)
MCHC RBC AUTO-ENTMCNC: 32 GM/DL (ref 32.2–35.5)
MCHC RBC AUTO-ENTMCNC: 32.1 GM/DL (ref 33–37)
MCHC RBC AUTO-ENTMCNC: 32.2 GM/DL (ref 32.2–35.5)
MCHC RBC AUTO-ENTMCNC: 32.2 GM/DL (ref 33–36)
MCHC RBC AUTO-ENTMCNC: 32.3 GM/DL (ref 33–37)
MCHC RBC AUTO-ENTMCNC: 32.8 GM/DL (ref 33–37)
MCHC RBC AUTO-ENTMCNC: 32.8 GM/DL (ref 33–37)
MCHC RBC AUTO-ENTMCNC: ABNORMAL G/DL
MCV RBC AUTO: 86.4 FL (ref 81–99)
MCV RBC AUTO: 86.4 FL (ref 81–99)
MCV RBC AUTO: 87.2 FL (ref 81–99)
MCV RBC AUTO: 87.2 FL (ref 81–99)
MCV RBC AUTO: 87.5 FL (ref 81–99)
MCV RBC AUTO: 89.1 FL (ref 81–99)
MCV RBC AUTO: 90.4 CU MIC (ref 80–97)
MCV RBC AUTO: 91.5 FL (ref 81–99)
MCV RBC AUTO: 92 FL (ref 81–99)
MCV RBC AUTO: 92.4 FL (ref 81–99)
MCV RBC AUTO: 96.7 FL (ref 81–99)
MCV RBC AUTO: 97.8 FL (ref 81–99)
MCV RBC AUTO: ABNORMAL FL
MESOTHELIAL CELLS BODY FLUID: NORMAL
MISC. #1 REFERENCE GROUP TEST: NORMAL
MISCELLANEOUS 2: ABNORMAL
MITOCHONDRIAL ANTIBODY: 2.8 UNITS (ref 0–20)
MONOCYTE, FLUID: 35 % (ref 25–100)
MONOCYTE, FLUID: 39 % (ref 25–100)
MONOCYTE, FLUID: 42 % (ref 25–100)
MONOCYTE, FLUID: 63 % (ref 25–100)
MONOCYTE, FLUID: 75 % (ref 25–100)
MONOCYTES # BLD: 10.3 %
MONOCYTES # BLD: 10.9 %
MONOCYTES # BLD: 11.9 %
MONOCYTES # BLD: 12 %
MONOCYTES # BLD: 13.1 %
MONOCYTES # BLD: 14.4 %
MONOCYTES # BLD: 15.5 %
MONOCYTES ABSOLUTE: 0.8 THOU/MM3 (ref 0.4–1.3)
MONOCYTES ABSOLUTE: 0.9 THOU/MM3 (ref 0.4–1.3)
MONOCYTES ABSOLUTE: 1 THOU/MM3 (ref 0.4–1.3)
MONOCYTES ABSOLUTE: 1.2 THOU/MM3 (ref 0.4–1.3)
MONOCYTES ABSOLUTE: ABNORMAL /ΜL
MONOCYTES RELATIVE PERCENT: 11.3 % (ref 2–10)
MONOCYTES RELATIVE PERCENT: ABNORMAL %
MONONUCLEAR CELLS BODY FLUID: 89.9 %
MONONUCLEAR CELLS BODY FLUID: 91.5 %
MONONUCLEAR CELLS BODY FLUID: 92.4 %
MONONUCLEAR CELLS BODY FLUID: 93 %
MONONUCLEAR CELLS BODY FLUID: 94.5 %
MONONUCLEAR CELLS BODY FLUID: 98 %
MRSA SCREEN RT-PCR: NEGATIVE
MRSA SCREEN: NORMAL
MYOGLOBIN URINE: < 1 MG/L (ref 0–1)
MYOGLOBIN URINE: POSITIVE
NEUTROPHILS ABSOLUTE: ABNORMAL /ΜL
NEUTROPHILS RELATIVE PERCENT: 70.9 % (ref 40–70)
NEUTROPHILS RELATIVE PERCENT: ABNORMAL %
NITRITE, URINE: NEGATIVE
NUCLEATED RBCS: 0.2 /100 WBC
NUCLEATED RED BLOOD CELLS: 0 /100 WBC
O2 SATURATION: 97 %
ORGANISM: ABNORMAL
OSMOLALITY CALCULATION: 285.2 MOSMOL/KG (ref 275–300)
OSMOLALITY CALCULATION: 285.2 MOSMOL/KG (ref 275–300)
OSMOLALITY CALCULATION: 285.3 MOSMOL/KG (ref 275–300)
OSMOLALITY CALCULATION: 288.1 MOSMOL/KG (ref 275–300)
OSMOLALITY CALCULATION: 288.6 MOSMOL/KG (ref 275–300)
OSMOLALITY CALCULATION: 289.1 MOSMOL/KG (ref 275–300)
OSMOLALITY CALCULATION: 303.4 MOSMOL/KG (ref 275–300)
OSMOLALITY CALCULATION: 305.3 MOSMOL/KG (ref 275–300)
OSMOLALITY URINE: 312 MOSMOL/KG (ref 250–750)
OSMOLALITY: 317 MOSMOL/KG (ref 275–295)
PATHOLOGIST REVIEW: NORMAL
PCO2: 48 MMHG (ref 35–45)
PDW BLD-RTO: 15.8 % (ref 11.5–14.5)
PDW BLD-RTO: 16.5 % (ref 11.5–14.5)
PDW BLD-RTO: 16.7 % (ref 11.5–14.5)
PDW BLD-RTO: 18.3 % (ref 11.5–14.5)
PDW BLD-RTO: 22 % (ref 12–16)
PH BLOOD GAS: 7.5 (ref 7.35–7.45)
PH UA: 5
PH UA: 5.5
PH UA: 6
PH VENOUS: 7.51 (ref 7.33–7.43)
PHOSPHORUS: 3.6 MG/DL (ref 2.4–4.7)
PLATELET # BLD: 101 THOU/MM3 (ref 130–400)
PLATELET # BLD: 105 THOU/MM3 (ref 130–400)
PLATELET # BLD: 113 THOU/MM3 (ref 130–400)
PLATELET # BLD: 119 TH/CMM (ref 150–400)
PLATELET # BLD: 123 THOU/MM3 (ref 130–400)
PLATELET # BLD: 134 K/ΜL
PLATELET # BLD: 137 THOU/MM3 (ref 130–400)
PLATELET # BLD: 138 THOU/MM3 (ref 130–400)
PLATELET # BLD: 146 THOU/MM3 (ref 130–400)
PLATELET # BLD: 156 THOU/MM3 (ref 130–400)
PLATELET # BLD: 166 THOU/MM3 (ref 130–400)
PLATELET # BLD: 168 THOU/MM3 (ref 130–400)
PLATELET # BLD: 256 THOU/MM3 (ref 130–400)
PMV BLD AUTO: 10.1 FL (ref 7.4–10.4)
PMV BLD AUTO: 10.3 FL (ref 9.4–12.4)
PMV BLD AUTO: 10.3 FL (ref 9.4–12.4)
PMV BLD AUTO: 10.6 FL (ref 9.4–12.4)
PMV BLD AUTO: 10.7 FL (ref 9.4–12.4)
PMV BLD AUTO: 10.9 FL (ref 9.4–12.4)
PMV BLD AUTO: 11 FL (ref 9.4–12.4)
PMV BLD AUTO: 12 FL (ref 9.4–12.4)
PMV BLD AUTO: 9.3 FL (ref 7.4–10.4)
PMV BLD AUTO: 9.4 FL (ref 7.4–10.4)
PMV BLD AUTO: 9.5 FL (ref 7.4–10.4)
PMV BLD AUTO: ABNORMAL FL
PO2: 88 MMHG (ref 71–104)
POIKILOCYTES: ABNORMAL
POLYMORPHONUCLEAR CELLS BODY FLUID: 10.1 %
POLYMORPHONUCLEAR CELLS BODY FLUID: 2 %
POLYMORPHONUCLEAR CELLS BODY FLUID: 5.5 %
POLYMORPHONUCLEAR CELLS BODY FLUID: 7 %
POLYMORPHONUCLEAR CELLS BODY FLUID: 7.6 %
POLYMORPHONUCLEAR CELLS BODY FLUID: 8.5 %
POTASSIUM REFLEX MAGNESIUM: 3 MEQ/L (ref 3.5–5.2)
POTASSIUM REFLEX MAGNESIUM: 3.2 MEQ/L (ref 3.5–5.2)
POTASSIUM REFLEX MAGNESIUM: 3.5 MEQ/L (ref 3.5–5.2)
POTASSIUM REFLEX MAGNESIUM: 5.2 MEQ/L (ref 3.5–5.2)
POTASSIUM REFLEX MAGNESIUM: 5.6 MEQ/L (ref 3.5–5.2)
POTASSIUM SERPL-SCNC: 2.8 MEQ/L (ref 3.5–5.2)
POTASSIUM SERPL-SCNC: 3.2 MEQ/L (ref 3.5–5.2)
POTASSIUM SERPL-SCNC: 3.3 MEQ/L (ref 3.5–5.2)
POTASSIUM SERPL-SCNC: 3.4 MEQ/L (ref 3.5–5.2)
POTASSIUM SERPL-SCNC: 3.5 MEQ/L (ref 3.5–5.2)
POTASSIUM SERPL-SCNC: 3.6 MEQ/L (ref 3.5–5.2)
POTASSIUM SERPL-SCNC: 3.6 MEQ/L (ref 3.5–5.2)
POTASSIUM SERPL-SCNC: 3.7 MEQ/L (ref 3.5–5.2)
POTASSIUM SERPL-SCNC: 3.8 MEQ/L (ref 3.5–5.2)
POTASSIUM SERPL-SCNC: 3.9 MEQ/L (ref 3.5–5.2)
POTASSIUM SERPL-SCNC: 4 MEQ/L (ref 3.5–5.2)
POTASSIUM SERPL-SCNC: 4 MEQ/L (ref 3.5–5.2)
POTASSIUM SERPL-SCNC: 4.1 MEQ/L (ref 3.5–5.2)
POTASSIUM SERPL-SCNC: 4.1 MEQ/L (ref 3.6–5)
POTASSIUM SERPL-SCNC: 4.2 MEQ/L (ref 3.5–5.2)
POTASSIUM SERPL-SCNC: 4.2 MEQ/L (ref 3.5–5.2)
POTASSIUM SERPL-SCNC: 4.3 MEQ/L (ref 3.5–5.2)
POTASSIUM SERPL-SCNC: 4.5 MEQ/L (ref 3.5–5.2)
POTASSIUM SERPL-SCNC: 4.5 MEQ/L (ref 3.5–5.2)
POTASSIUM SERPL-SCNC: 4.6 MEQ/L (ref 3.5–5.2)
POTASSIUM SERPL-SCNC: 5 MEQ/L (ref 3.5–5.2)
POTASSIUM SERPL-SCNC: 5.1 MEQ/L (ref 3.5–5.2)
POTASSIUM SERPL-SCNC: 5.2 MEQ/L (ref 3.5–5.2)
POTASSIUM SERPL-SCNC: 5.2 MEQ/L (ref 3.5–5.2)
POTASSIUM SERPL-SCNC: 5.3 MEQ/L (ref 3.5–5.2)
POTASSIUM SERPL-SCNC: 5.4 MEQ/L (ref 3.5–5.2)
POTASSIUM SERPL-SCNC: 5.4 MEQ/L (ref 3.5–5.2)
POTASSIUM SERPL-SCNC: 5.7 MMOL/L
POTASSIUM SERPL-SCNC: 6.3 MEQ/L (ref 3.5–5.2)
POTASSIUM, URINE: 26.7 MEQ/L
PRO-BNP: 5680 PG/ML (ref 0–1800)
PRO-BNP: 6923 PG/ML (ref 0–1800)
PRO-BNP: 8214 PG/ML (ref 0–1800)
PRO-BNP: ABNORMAL PG/ML (ref 0–1800)
PRO-BNP: ABNORMAL PG/ML (ref 0–1800)
PROTEIN FLUID: 2.7 GM/DL
PROTEIN FLUID: 2.9 GM/DL
PROTEIN FLUID: 3 GM/DL
PROTEIN FLUID: 3.3 GM/DL
PROTEIN FLUID: 4.2 GM/DL
PROTEIN FLUID: 4.3 GM/DL
PROTEIN UA: ABNORMAL
QUANTIFERON (R) TB GOLD (INCUBATED): NEGATIVE
QUANTIFERON MITOGEN MINUS NIL: > 10 IU/ML
QUANTIFERON NIL: 0.02 IU/ML
QUANTIFERON TB AG MINUS NIL: 0 IU/ML (ref 0–0.34)
RBC # BLD: 3.2 MILL/MM3 (ref 4.2–5.4)
RBC # BLD: 3.27 MILL/MM3 (ref 4.2–5.4)
RBC # BLD: 3.27 MILL/MM3 (ref 4.2–5.4)
RBC # BLD: 3.37 10^6/ΜL
RBC # BLD: 3.37 MILL/MM3 (ref 4.2–5.4)
RBC # BLD: 3.37 MILL/MM3 (ref 4.2–5.4)
RBC # BLD: 3.41 MILL/MM3 (ref 4.2–5.4)
RBC # BLD: 3.51 MILL/MM3 (ref 4.2–5.4)
RBC # BLD: 3.63 MILL/MM3 (ref 4.2–5.4)
RBC # BLD: 3.64 MIL/CMM (ref 4–5.1)
RBC # BLD: 3.93 MILL/MM3 (ref 4.2–5.4)
RBC # BLD: 4.11 MILL/MM3 (ref 4.2–5.4)
RBC # BLD: 4.39 MILL/MM3 (ref 4.2–5.4)
RBC FLUID: 1072 /CUMM (ref 0–100)
RBC FLUID: 1259 /CUMM (ref 0–100)
RBC FLUID: 2699 /CUMM (ref 0–100)
RBC FLUID: 3347 /CUMM (ref 0–100)
RBC FLUID: 4505 /CUMM (ref 0–100)
RBC URINE: ABNORMAL /HPF
RENAL EPITHELIAL, UA: ABNORMAL
RH FACTOR: NORMAL
SCAN OF BLOOD SMEAR: NORMAL
SEG NEUTROPHILS: 60.8 %
SEG NEUTROPHILS: 63.9 %
SEG NEUTROPHILS: 68.1 %
SEG NEUTROPHILS: 68.2 %
SEG NEUTROPHILS: 70.8 %
SEG NEUTROPHILS: 74.3 %
SEG NEUTROPHILS: 75.9 %
SEGMENTED NEUTROPHILS ABSOLUTE COUNT: 3.5 THOU/MM3 (ref 1.8–7.7)
SEGMENTED NEUTROPHILS ABSOLUTE COUNT: 4.1 THOU/MM3 (ref 1.8–7.7)
SEGMENTED NEUTROPHILS ABSOLUTE COUNT: 4.9 THOU/MM3 (ref 1.8–7.7)
SEGMENTED NEUTROPHILS ABSOLUTE COUNT: 5.4 THOU/MM3 (ref 1.8–7.7)
SEGMENTED NEUTROPHILS ABSOLUTE COUNT: 5.4 THOU/MM3 (ref 1.8–7.7)
SEGMENTED NEUTROPHILS ABSOLUTE COUNT: 6.3 THOU/MM3 (ref 1.8–7.7)
SEGMENTED NEUTROPHILS ABSOLUTE COUNT: 7.8 THOU/MM3 (ref 1.8–7.7)
SEGMENTED NEUTROPHILS, BODY FLUID: 2 % (ref 0–25)
SEGMENTED NEUTROPHILS, BODY FLUID: 2 % (ref 0–25)
SEGMENTED NEUTROPHILS, BODY FLUID: 4 % (ref 0–25)
SEGMENTED NEUTROPHILS, BODY FLUID: 46 % (ref 0–25)
SEGMENTED NEUTROPHILS, BODY FLUID: 9 % (ref 0–25)
SODIUM BLD-SCNC: 130 MEQ/L (ref 135–145)
SODIUM BLD-SCNC: 131 MEQ/L (ref 135–145)
SODIUM BLD-SCNC: 132 MEQ/L (ref 135–145)
SODIUM BLD-SCNC: 133 MEQ/L (ref 135–145)
SODIUM BLD-SCNC: 134 MEQ/L (ref 135–145)
SODIUM BLD-SCNC: 135 MEQ/L (ref 135–145)
SODIUM BLD-SCNC: 135 MEQ/L (ref 135–145)
SODIUM BLD-SCNC: 135 MMOL/L
SODIUM BLD-SCNC: 136 MEQ/L (ref 135–145)
SODIUM BLD-SCNC: 137 MEQ/L (ref 135–145)
SODIUM BLD-SCNC: 138 MEQ/L (ref 135–145)
SODIUM BLD-SCNC: 139 MEQ/L (ref 135–145)
SODIUM URINE: 35 MEQ/L
SOURCE, BLOOD GAS: ABNORMAL
SPECIFIC GRAVITY, URINE: 1.01 (ref 1–1.03)
SPECIFIC GRAVITY, URINE: 1.02 (ref 1–1.03)
SPECIFIC GRAVITY, URINE: 1.02 (ref 1–1.03)
SPECIMEN: ABNORMAL
SPECIMEN: NORMAL
T4 FREE: 1.39 NG/DL (ref 0.93–1.76)
TARGET CELLS: ABNORMAL
TOTAL CK: 43 U/L (ref 30–135)
TOTAL NUCLEATED CELLS BODY FLUID: 110 /CUMM (ref 0–500)
TOTAL NUCLEATED CELLS BODY FLUID: 293 /CUMM (ref 0–500)
TOTAL NUCLEATED CELLS BODY FLUID: 317 /CUMM (ref 0–500)
TOTAL NUCLEATED CELLS BODY FLUID: 319 /CUMM (ref 0–500)
TOTAL NUCLEATED CELLS BODY FLUID: 319 /CUMM (ref 0–500)
TOTAL NUCLEATED CELLS BODY FLUID: 420 /CUMM (ref 0–500)
TOTAL PROTEIN: 5.6 G/DL (ref 6.1–8)
TOTAL PROTEIN: 5.9 G/DL (ref 6.1–8)
TOTAL PROTEIN: 6 G/DL (ref 6.1–8)
TOTAL PROTEIN: 6.1 G/DL (ref 6.1–8)
TOTAL PROTEIN: 6.2 G/DL (ref 6.1–8)
TOTAL PROTEIN: 6.2 G/DL (ref 6.1–8)
TOTAL PROTEIN: 6.5 G/DL (ref 6.1–8)
TOTAL PROTEIN: 6.5 G/DL (ref 6.1–8)
TOTAL PROTEIN: 7.4 G/DL (ref 6.1–8)
TOTAL VOLUME RECEIVED BODY FLUID: 70 ML
TOTAL VOLUME RECEIVED BODY FLUID: 80 ML
TOTAL VOLUME RECEIVED BODY FLUID: NORMAL ML
TROPONIN T: 0.1 NG/ML
TROPONIN T: 0.11 NG/ML
TROPONIN T: 0.11 NG/ML
TROPONIN T: < 0.01 NG/ML
TSH SERPL DL<=0.05 MIU/L-ACNC: 5.04 UIU/ML (ref 0.4–4.2)
URIC ACID: 15.2 MG/DL (ref 2.4–5.7)
URINE CULTURE REFLEX: ABNORMAL
UROBILINOGEN, URINE: 0.2 EU/DL
VRE CULTURE: ABNORMAL
WBC # BLD: 10.3 THOU/MM3 (ref 4.8–10.8)
WBC # BLD: 5.5 THOU/MM3 (ref 4.8–10.8)
WBC # BLD: 5.7 THOU/MM3 (ref 4.8–10.8)
WBC # BLD: 6.4 10^3/ML
WBC # BLD: 6.4 THOU/MM3 (ref 4.8–10.8)
WBC # BLD: 6.9 TH/CMM (ref 4.4–10.5)
WBC # BLD: 6.9 THOU/MM3 (ref 4.8–10.8)
WBC # BLD: 7.9 THOU/MM3 (ref 4.8–10.8)
WBC # BLD: 8.1 THOU/MM3 (ref 4.8–10.8)
WBC # BLD: 8.5 THOU/MM3 (ref 4.8–10.8)
WBC # BLD: 8.6 THOU/MM3 (ref 4.8–10.8)
WBC FLUID: 208 /MM3 (ref 0–500)
WBC FLUID: 227 /MM3 (ref 0–500)
WBC FLUID: 342 /MM3 (ref 0–500)
WBC FLUID: 400 /MM3 (ref 0–500)
WBC FLUID: 853 /MM3 (ref 0–500)
WBC UA: > 100 /HPF
WBC UA: > 100 /HPF
WBC UA: ABNORMAL /HPF
YEAST: ABNORMAL

## 2018-01-01 PROCEDURE — 80048 BASIC METABOLIC PNL TOTAL CA: CPT

## 2018-01-01 PROCEDURE — 87075 CULTR BACTERIA EXCEPT BLOOD: CPT

## 2018-01-01 PROCEDURE — 49083 ABD PARACENTESIS W/IMAGING: CPT

## 2018-01-01 PROCEDURE — 84157 ASSAY OF PROTEIN OTHER: CPT

## 2018-01-01 PROCEDURE — 82140 ASSAY OF AMMONIA: CPT

## 2018-01-01 PROCEDURE — 0W9G3ZZ DRAINAGE OF PERITONEAL CAVITY, PERCUTANEOUS APPROACH: ICD-10-PCS | Performed by: RADIOLOGY

## 2018-01-01 PROCEDURE — 83735 ASSAY OF MAGNESIUM: CPT

## 2018-01-01 PROCEDURE — 85610 PROTHROMBIN TIME: CPT

## 2018-01-01 PROCEDURE — 99204 OFFICE O/P NEW MOD 45 MIN: CPT | Performed by: NURSE PRACTITIONER

## 2018-01-01 PROCEDURE — 6370000000 HC RX 637 (ALT 250 FOR IP): Performed by: NURSE PRACTITIONER

## 2018-01-01 PROCEDURE — 99213 OFFICE O/P EST LOW 20 MIN: CPT | Performed by: INTERNAL MEDICINE

## 2018-01-01 PROCEDURE — 80053 COMPREHEN METABOLIC PANEL: CPT

## 2018-01-01 PROCEDURE — 36415 COLL VENOUS BLD VENIPUNCTURE: CPT

## 2018-01-01 PROCEDURE — 99239 HOSP IP/OBS DSCHRG MGMT >30: CPT | Performed by: INTERNAL MEDICINE

## 2018-01-01 PROCEDURE — 1111F DSCHRG MED/CURRENT MED MERGE: CPT | Performed by: NURSE PRACTITIONER

## 2018-01-01 PROCEDURE — 83605 ASSAY OF LACTIC ACID: CPT

## 2018-01-01 PROCEDURE — 88305 TISSUE EXAM BY PATHOLOGIST: CPT

## 2018-01-01 PROCEDURE — 6370000000 HC RX 637 (ALT 250 FOR IP): Performed by: INTERNAL MEDICINE

## 2018-01-01 PROCEDURE — 2060000000 HC ICU INTERMEDIATE R&B

## 2018-01-01 PROCEDURE — 82948 REAGENT STRIP/BLOOD GLUCOSE: CPT

## 2018-01-01 PROCEDURE — 2580000003 HC RX 258: Performed by: INTERNAL MEDICINE

## 2018-01-01 PROCEDURE — 87040 BLOOD CULTURE FOR BACTERIA: CPT

## 2018-01-01 PROCEDURE — 97166 OT EVAL MOD COMPLEX 45 MIN: CPT

## 2018-01-01 PROCEDURE — 6360000002 HC RX W HCPCS: Performed by: INTERNAL MEDICINE

## 2018-01-01 PROCEDURE — 97535 SELF CARE MNGMENT TRAINING: CPT

## 2018-01-01 PROCEDURE — 84484 ASSAY OF TROPONIN QUANT: CPT

## 2018-01-01 PROCEDURE — 1090F PRES/ABSN URINE INCON ASSESS: CPT | Performed by: NURSE PRACTITIONER

## 2018-01-01 PROCEDURE — 83516 IMMUNOASSAY NONANTIBODY: CPT

## 2018-01-01 PROCEDURE — 99232 SBSQ HOSP IP/OBS MODERATE 35: CPT | Performed by: INTERNAL MEDICINE

## 2018-01-01 PROCEDURE — 84443 ASSAY THYROID STIM HORMONE: CPT

## 2018-01-01 PROCEDURE — 83874 ASSAY OF MYOGLOBIN: CPT

## 2018-01-01 PROCEDURE — 87070 CULTURE OTHR SPECIMN AEROBIC: CPT

## 2018-01-01 PROCEDURE — A4614 HAND-HELD PEFR METER: HCPCS

## 2018-01-01 PROCEDURE — 87205 SMEAR GRAM STAIN: CPT

## 2018-01-01 PROCEDURE — 6360000002 HC RX W HCPCS: Performed by: EMERGENCY MEDICINE

## 2018-01-01 PROCEDURE — 82962 GLUCOSE BLOOD TEST: CPT | Performed by: NURSE PRACTITIONER

## 2018-01-01 PROCEDURE — 82248 BILIRUBIN DIRECT: CPT

## 2018-01-01 PROCEDURE — 86706 HEP B SURFACE ANTIBODY: CPT

## 2018-01-01 PROCEDURE — 94640 AIRWAY INHALATION TREATMENT: CPT

## 2018-01-01 PROCEDURE — 99285 EMERGENCY DEPT VISIT HI MDM: CPT

## 2018-01-01 PROCEDURE — 99233 SBSQ HOSP IP/OBS HIGH 50: CPT | Performed by: INTERNAL MEDICINE

## 2018-01-01 PROCEDURE — 87641 MR-STAPH DNA AMP PROBE: CPT

## 2018-01-01 PROCEDURE — 81001 URINALYSIS AUTO W/SCOPE: CPT

## 2018-01-01 PROCEDURE — 71046 X-RAY EXAM CHEST 2 VIEWS: CPT

## 2018-01-01 PROCEDURE — 83880 ASSAY OF NATRIURETIC PEPTIDE: CPT

## 2018-01-01 PROCEDURE — 84132 ASSAY OF SERUM POTASSIUM: CPT

## 2018-01-01 PROCEDURE — 85025 COMPLETE CBC W/AUTO DIFF WBC: CPT

## 2018-01-01 PROCEDURE — 82042 OTHER SOURCE ALBUMIN QUAN EA: CPT

## 2018-01-01 PROCEDURE — 85027 COMPLETE CBC AUTOMATED: CPT

## 2018-01-01 PROCEDURE — 93005 ELECTROCARDIOGRAM TRACING: CPT | Performed by: FAMILY MEDICINE

## 2018-01-01 PROCEDURE — 99223 1ST HOSP IP/OBS HIGH 75: CPT | Performed by: INTERNAL MEDICINE

## 2018-01-01 PROCEDURE — 82570 ASSAY OF URINE CREATININE: CPT

## 2018-01-01 PROCEDURE — 6370000000 HC RX 637 (ALT 250 FOR IP): Performed by: FAMILY MEDICINE

## 2018-01-01 PROCEDURE — 96375 TX/PRO/DX INJ NEW DRUG ADDON: CPT

## 2018-01-01 PROCEDURE — 93010 ELECTROCARDIOGRAM REPORT: CPT | Performed by: INTERNAL MEDICINE

## 2018-01-01 PROCEDURE — 86039 ANTINUCLEAR ANTIBODIES (ANA): CPT

## 2018-01-01 PROCEDURE — 87184 SC STD DISK METHOD PER PLATE: CPT

## 2018-01-01 PROCEDURE — 83930 ASSAY OF BLOOD OSMOLALITY: CPT

## 2018-01-01 PROCEDURE — 99232 SBSQ HOSP IP/OBS MODERATE 35: CPT | Performed by: PHYSICIAN ASSISTANT

## 2018-01-01 PROCEDURE — 86480 TB TEST CELL IMMUN MEASURE: CPT

## 2018-01-01 PROCEDURE — G8400 PT W/DXA NO RESULTS DOC: HCPCS | Performed by: NURSE PRACTITIONER

## 2018-01-01 PROCEDURE — 85018 HEMOGLOBIN: CPT

## 2018-01-01 PROCEDURE — 89050 BODY FLUID CELL COUNT: CPT

## 2018-01-01 PROCEDURE — 97162 PT EVAL MOD COMPLEX 30 MIN: CPT

## 2018-01-01 PROCEDURE — 71045 X-RAY EXAM CHEST 1 VIEW: CPT

## 2018-01-01 PROCEDURE — 83690 ASSAY OF LIPASE: CPT

## 2018-01-01 PROCEDURE — 89051 BODY FLUID CELL COUNT: CPT

## 2018-01-01 PROCEDURE — 99284 EMERGENCY DEPT VISIT MOD MDM: CPT

## 2018-01-01 PROCEDURE — 74176 CT ABD & PELVIS W/O CONTRAST: CPT

## 2018-01-01 PROCEDURE — 84133 ASSAY OF URINE POTASSIUM: CPT

## 2018-01-01 PROCEDURE — G8427 DOCREV CUR MEDS BY ELIG CLIN: HCPCS | Performed by: NURSE PRACTITIONER

## 2018-01-01 PROCEDURE — 86200 CCP ANTIBODY: CPT

## 2018-01-01 PROCEDURE — 1200000003 HC TELEMETRY R&B

## 2018-01-01 PROCEDURE — 84100 ASSAY OF PHOSPHORUS: CPT

## 2018-01-01 PROCEDURE — G8978 MOBILITY CURRENT STATUS: HCPCS

## 2018-01-01 PROCEDURE — 86708 HEPATITIS A ANTIBODY: CPT

## 2018-01-01 PROCEDURE — 82550 ASSAY OF CK (CPK): CPT

## 2018-01-01 PROCEDURE — 86850 RBC ANTIBODY SCREEN: CPT

## 2018-01-01 PROCEDURE — 2580000003 HC RX 258: Performed by: FAMILY MEDICINE

## 2018-01-01 PROCEDURE — 80076 HEPATIC FUNCTION PANEL: CPT

## 2018-01-01 PROCEDURE — 76856 US EXAM PELVIC COMPLETE: CPT

## 2018-01-01 PROCEDURE — 88112 CYTOPATH CELL ENHANCE TECH: CPT

## 2018-01-01 PROCEDURE — P9046 ALBUMIN (HUMAN), 25%, 20 ML: HCPCS | Performed by: NURSE PRACTITIONER

## 2018-01-01 PROCEDURE — 87147 CULTURE TYPE IMMUNOLOGIC: CPT

## 2018-01-01 PROCEDURE — 93005 ELECTROCARDIOGRAM TRACING: CPT | Performed by: INTERNAL MEDICINE

## 2018-01-01 PROCEDURE — 97110 THERAPEUTIC EXERCISES: CPT

## 2018-01-01 PROCEDURE — 97116 GAIT TRAINING THERAPY: CPT

## 2018-01-01 PROCEDURE — G8988 SELF CARE GOAL STATUS: HCPCS

## 2018-01-01 PROCEDURE — 83935 ASSAY OF URINE OSMOLALITY: CPT

## 2018-01-01 PROCEDURE — 70450 CT HEAD/BRAIN W/O DYE: CPT

## 2018-01-01 PROCEDURE — C9113 INJ PANTOPRAZOLE SODIUM, VIA: HCPCS | Performed by: INTERNAL MEDICINE

## 2018-01-01 PROCEDURE — 93971 EXTREMITY STUDY: CPT

## 2018-01-01 PROCEDURE — 93005 ELECTROCARDIOGRAM TRACING: CPT | Performed by: EMERGENCY MEDICINE

## 2018-01-01 PROCEDURE — 87086 URINE CULTURE/COLONY COUNT: CPT

## 2018-01-01 PROCEDURE — 86900 BLOOD TYPING SEROLOGIC ABO: CPT

## 2018-01-01 PROCEDURE — 99232 SBSQ HOSP IP/OBS MODERATE 35: CPT | Performed by: NURSE PRACTITIONER

## 2018-01-01 PROCEDURE — 0W9G3ZX DRAINAGE OF PERITONEAL CAVITY, PERCUTANEOUS APPROACH, DIAGNOSTIC: ICD-10-PCS | Performed by: RADIOLOGY

## 2018-01-01 PROCEDURE — 84300 ASSAY OF URINE SODIUM: CPT

## 2018-01-01 PROCEDURE — 82436 ASSAY OF URINE CHLORIDE: CPT

## 2018-01-01 PROCEDURE — 76830 TRANSVAGINAL US NON-OB: CPT

## 2018-01-01 PROCEDURE — 51702 INSERT TEMP BLADDER CATH: CPT

## 2018-01-01 PROCEDURE — 4040F PNEUMOC VAC/ADMIN/RCVD: CPT | Performed by: NURSE PRACTITIONER

## 2018-01-01 PROCEDURE — 51798 US URINE CAPACITY MEASURE: CPT

## 2018-01-01 PROCEDURE — 2500000003 HC RX 250 WO HCPCS: Performed by: EMERGENCY MEDICINE

## 2018-01-01 PROCEDURE — G8987 SELF CARE CURRENT STATUS: HCPCS

## 2018-01-01 PROCEDURE — A4328 FEM URINARY COLLECT POUCH: HCPCS

## 2018-01-01 PROCEDURE — 97530 THERAPEUTIC ACTIVITIES: CPT

## 2018-01-01 PROCEDURE — 86304 IMMUNOASSAY TUMOR CA 125: CPT

## 2018-01-01 PROCEDURE — G8419 CALC BMI OUT NRM PARAM NOF/U: HCPCS | Performed by: NURSE PRACTITIONER

## 2018-01-01 PROCEDURE — G8979 MOBILITY GOAL STATUS: HCPCS

## 2018-01-01 PROCEDURE — 82378 CARCINOEMBRYONIC ANTIGEN: CPT

## 2018-01-01 PROCEDURE — 87077 CULTURE AEROBIC IDENTIFY: CPT

## 2018-01-01 PROCEDURE — P9046 ALBUMIN (HUMAN), 25%, 20 ML: HCPCS | Performed by: INTERNAL MEDICINE

## 2018-01-01 PROCEDURE — 73060 X-RAY EXAM OF HUMERUS: CPT

## 2018-01-01 PROCEDURE — 82040 ASSAY OF SERUM ALBUMIN: CPT

## 2018-01-01 PROCEDURE — 86704 HEP B CORE ANTIBODY TOTAL: CPT

## 2018-01-01 PROCEDURE — 6370000000 HC RX 637 (ALT 250 FOR IP): Performed by: EMERGENCY MEDICINE

## 2018-01-01 PROCEDURE — 6360000002 HC RX W HCPCS: Performed by: FAMILY MEDICINE

## 2018-01-01 PROCEDURE — 36600 WITHDRAWAL OF ARTERIAL BLOOD: CPT

## 2018-01-01 PROCEDURE — 87081 CULTURE SCREEN ONLY: CPT

## 2018-01-01 PROCEDURE — 74022 RADEX COMPL AQT ABD SERIES: CPT

## 2018-01-01 PROCEDURE — 76705 ECHO EXAM OF ABDOMEN: CPT

## 2018-01-01 PROCEDURE — 96374 THER/PROPH/DIAG INJ IV PUSH: CPT

## 2018-01-01 PROCEDURE — 2500000003 HC RX 250 WO HCPCS: Performed by: INTERNAL MEDICINE

## 2018-01-01 PROCEDURE — 87186 SC STD MICRODIL/AGAR DIL: CPT

## 2018-01-01 PROCEDURE — 84311 SPECTROPHOTOMETRY: CPT

## 2018-01-01 PROCEDURE — 99283 EMERGENCY DEPT VISIT LOW MDM: CPT

## 2018-01-01 PROCEDURE — 1036F TOBACCO NON-USER: CPT | Performed by: NURSE PRACTITIONER

## 2018-01-01 PROCEDURE — 84550 ASSAY OF BLOOD/URIC ACID: CPT

## 2018-01-01 PROCEDURE — 82150 ASSAY OF AMYLASE: CPT

## 2018-01-01 PROCEDURE — 6370000000 HC RX 637 (ALT 250 FOR IP): Performed by: HOSPITALIST

## 2018-01-01 PROCEDURE — 1123F ACP DISCUSS/DSCN MKR DOCD: CPT | Performed by: NURSE PRACTITIONER

## 2018-01-01 PROCEDURE — 89190 NASAL SMEAR FOR EOSINOPHILS: CPT

## 2018-01-01 PROCEDURE — 87340 HEPATITIS B SURFACE AG IA: CPT

## 2018-01-01 PROCEDURE — C9113 INJ PANTOPRAZOLE SODIUM, VIA: HCPCS | Performed by: FAMILY MEDICINE

## 2018-01-01 PROCEDURE — 82272 OCCULT BLD FECES 1-3 TESTS: CPT

## 2018-01-01 PROCEDURE — 85014 HEMATOCRIT: CPT

## 2018-01-01 PROCEDURE — 80162 ASSAY OF DIGOXIN TOTAL: CPT

## 2018-01-01 PROCEDURE — 93975 VASCULAR STUDY: CPT

## 2018-01-01 PROCEDURE — 83036 HEMOGLOBIN GLYCOSYLATED A1C: CPT | Performed by: NURSE PRACTITIONER

## 2018-01-01 PROCEDURE — 99223 1ST HOSP IP/OBS HIGH 75: CPT | Performed by: NUCLEAR MEDICINE

## 2018-01-01 PROCEDURE — 93306 TTE W/DOPPLER COMPLETE: CPT

## 2018-01-01 PROCEDURE — 82105 ALPHA-FETOPROTEIN SERUM: CPT

## 2018-01-01 PROCEDURE — G8598 ASA/ANTIPLAT THER USED: HCPCS | Performed by: NURSE PRACTITIONER

## 2018-01-01 PROCEDURE — 86901 BLOOD TYPING SEROLOGIC RH(D): CPT

## 2018-01-01 PROCEDURE — 82803 BLOOD GASES ANY COMBINATION: CPT

## 2018-01-01 PROCEDURE — 85730 THROMBOPLASTIN TIME PARTIAL: CPT

## 2018-01-01 PROCEDURE — 86803 HEPATITIS C AB TEST: CPT

## 2018-01-01 PROCEDURE — 2709999900 HC NON-CHARGEABLE SUPPLY

## 2018-01-01 PROCEDURE — 82800 BLOOD PH: CPT

## 2018-01-01 PROCEDURE — 93005 ELECTROCARDIOGRAM TRACING: CPT | Performed by: STUDENT IN AN ORGANIZED HEALTH CARE EDUCATION/TRAINING PROGRAM

## 2018-01-01 PROCEDURE — 83036 HEMOGLOBIN GLYCOSYLATED A1C: CPT

## 2018-01-01 PROCEDURE — 99221 1ST HOSP IP/OBS SF/LOW 40: CPT | Performed by: INTERNAL MEDICINE

## 2018-01-01 PROCEDURE — 84439 ASSAY OF FREE THYROXINE: CPT

## 2018-01-01 PROCEDURE — 6360000002 HC RX W HCPCS: Performed by: NURSE PRACTITIONER

## 2018-01-01 PROCEDURE — 86038 ANTINUCLEAR ANTIBODIES: CPT

## 2018-01-01 RX ORDER — POTASSIUM CHLORIDE 750 MG/1
40 TABLET, FILM COATED, EXTENDED RELEASE ORAL ONCE
Status: COMPLETED | OUTPATIENT
Start: 2018-01-01 | End: 2018-01-01

## 2018-01-01 RX ORDER — HYDROCORTISONE ACETATE 25 MG/1
25 SUPPOSITORY RECTAL 2 TIMES DAILY
Qty: 30 SUPPOSITORY | Refills: 1 | Status: SHIPPED | OUTPATIENT
Start: 2018-01-01

## 2018-01-01 RX ORDER — PANTOPRAZOLE SODIUM 40 MG/1
40 TABLET, DELAYED RELEASE ORAL
Status: DISCONTINUED | OUTPATIENT
Start: 2018-01-01 | End: 2018-01-01 | Stop reason: HOSPADM

## 2018-01-01 RX ORDER — FUROSEMIDE 10 MG/ML
80 INJECTION INTRAMUSCULAR; INTRAVENOUS 2 TIMES DAILY
Status: COMPLETED | OUTPATIENT
Start: 2018-01-01 | End: 2018-01-01

## 2018-01-01 RX ORDER — ALLOPURINOL 100 MG/1
100 TABLET ORAL DAILY
Status: DISCONTINUED | OUTPATIENT
Start: 2018-01-01 | End: 2018-01-01 | Stop reason: HOSPADM

## 2018-01-01 RX ORDER — PANTOPRAZOLE SODIUM 40 MG/10ML
40 INJECTION, POWDER, LYOPHILIZED, FOR SOLUTION INTRAVENOUS DAILY
Status: DISCONTINUED | OUTPATIENT
Start: 2018-01-01 | End: 2018-01-01

## 2018-01-01 RX ORDER — AMOXICILLIN AND CLAVULANATE POTASSIUM 875; 125 MG/1; MG/1
1 TABLET, FILM COATED ORAL EVERY 12 HOURS SCHEDULED
Status: DISCONTINUED | OUTPATIENT
Start: 2018-01-01 | End: 2018-01-01

## 2018-01-01 RX ORDER — GLIPIZIDE 5 MG/1
2.5 TABLET ORAL
Status: DISCONTINUED | OUTPATIENT
Start: 2018-01-01 | End: 2018-01-01 | Stop reason: HOSPADM

## 2018-01-01 RX ORDER — PANTOPRAZOLE SODIUM 40 MG/10ML
40 INJECTION, POWDER, LYOPHILIZED, FOR SOLUTION INTRAVENOUS 2 TIMES DAILY
Status: DISCONTINUED | OUTPATIENT
Start: 2018-01-01 | End: 2018-01-01

## 2018-01-01 RX ORDER — MULTIVITAMIN WITH FOLIC ACID 400 MCG
1 TABLET ORAL DAILY
Status: DISCONTINUED | OUTPATIENT
Start: 2018-01-01 | End: 2018-01-01 | Stop reason: HOSPADM

## 2018-01-01 RX ORDER — LACTULOSE 10 G/15ML
10 SOLUTION ORAL DAILY
COMMUNITY
End: 2018-01-01 | Stop reason: CLARIF

## 2018-01-01 RX ORDER — TRAZODONE HYDROCHLORIDE 50 MG/1
25 TABLET ORAL NIGHTLY PRN
Qty: 30 TABLET | Refills: 0 | Status: SHIPPED | OUTPATIENT
Start: 2018-01-01

## 2018-01-01 RX ORDER — ALBUMIN, HUMAN INJ 5% 5 %
25 SOLUTION INTRAVENOUS ONCE
Status: DISCONTINUED | OUTPATIENT
Start: 2018-01-01 | End: 2018-01-01

## 2018-01-01 RX ORDER — SODIUM POLYSTYRENE SULFONATE 15 G/60ML
15 SUSPENSION ORAL; RECTAL ONCE
Status: COMPLETED | OUTPATIENT
Start: 2018-01-01 | End: 2018-01-01

## 2018-01-01 RX ORDER — NICOTINE POLACRILEX 4 MG
15 LOZENGE BUCCAL PRN
Status: DISCONTINUED | OUTPATIENT
Start: 2018-01-01 | End: 2018-01-01 | Stop reason: HOSPADM

## 2018-01-01 RX ORDER — ASCORBIC ACID 500 MG
500 TABLET ORAL DAILY
COMMUNITY

## 2018-01-01 RX ORDER — HYDROCORTISONE ACETATE 25 MG/1
25 SUPPOSITORY RECTAL 2 TIMES DAILY
Status: COMPLETED | OUTPATIENT
Start: 2018-01-01 | End: 2018-01-01

## 2018-01-01 RX ORDER — SENNA PLUS 8.6 MG/1
15 TABLET ORAL ONCE
Status: DISCONTINUED | OUTPATIENT
Start: 2018-01-01 | End: 2018-01-01

## 2018-01-01 RX ORDER — AMITRIPTYLINE HYDROCHLORIDE 50 MG/1
50 TABLET, FILM COATED ORAL NIGHTLY
COMMUNITY
End: 2018-01-01 | Stop reason: ALTCHOICE

## 2018-01-01 RX ORDER — HYDROCORTISONE ACETATE 25 MG/1
25 SUPPOSITORY RECTAL 2 TIMES DAILY
Qty: 30 SUPPOSITORY | Refills: 1 | Status: SHIPPED | OUTPATIENT
Start: 2018-01-01 | End: 2018-01-01

## 2018-01-01 RX ORDER — DIGOXIN 125 MCG
125 TABLET ORAL
Qty: 30 TABLET | Refills: 3 | Status: SHIPPED | OUTPATIENT
Start: 2018-01-01

## 2018-01-01 RX ORDER — TRAZODONE HYDROCHLORIDE 50 MG/1
25 TABLET ORAL NIGHTLY
Status: DISCONTINUED | OUTPATIENT
Start: 2018-01-01 | End: 2018-01-01

## 2018-01-01 RX ORDER — COVID-19 ANTIGEN TEST
220 KIT MISCELLANEOUS DAILY PRN
Status: ON HOLD | COMMUNITY
End: 2018-01-01

## 2018-01-01 RX ORDER — FUROSEMIDE 20 MG/1
60 TABLET ORAL 2 TIMES DAILY
Qty: 60 TABLET | Refills: 3 | Status: SHIPPED | OUTPATIENT
Start: 2018-01-01 | End: 2018-01-01 | Stop reason: HOSPADM

## 2018-01-01 RX ORDER — ROPINIROLE 0.5 MG/1
0.5 TABLET, FILM COATED ORAL NIGHTLY
Status: DISCONTINUED | OUTPATIENT
Start: 2018-01-01 | End: 2018-01-01 | Stop reason: HOSPADM

## 2018-01-01 RX ORDER — SODIUM CHLORIDE 0.9 % (FLUSH) 0.9 %
10 SYRINGE (ML) INJECTION EVERY 12 HOURS SCHEDULED
Status: DISCONTINUED | OUTPATIENT
Start: 2018-01-01 | End: 2018-01-01 | Stop reason: HOSPADM

## 2018-01-01 RX ORDER — CHOLESTYRAMINE LIGHT 4 G/5.7G
4 POWDER, FOR SUSPENSION ORAL DAILY
Status: DISCONTINUED | OUTPATIENT
Start: 2018-01-01 | End: 2018-01-01 | Stop reason: HOSPADM

## 2018-01-01 RX ORDER — FLUOXETINE HYDROCHLORIDE 20 MG/1
20 CAPSULE ORAL DAILY
Status: DISCONTINUED | OUTPATIENT
Start: 2018-01-01 | End: 2018-01-01 | Stop reason: HOSPADM

## 2018-01-01 RX ORDER — DEXTROSE MONOHYDRATE 25 G/50ML
12.5 INJECTION, SOLUTION INTRAVENOUS PRN
Status: DISCONTINUED | OUTPATIENT
Start: 2018-01-01 | End: 2018-01-01 | Stop reason: HOSPADM

## 2018-01-01 RX ORDER — METOLAZONE 5 MG/1
5 TABLET ORAL ONCE
Status: COMPLETED | OUTPATIENT
Start: 2018-01-01 | End: 2018-01-01

## 2018-01-01 RX ORDER — SODIUM CHLORIDE 9 MG/ML
INJECTION, SOLUTION INTRAVENOUS CONTINUOUS
Status: DISCONTINUED | OUTPATIENT
Start: 2018-01-01 | End: 2018-01-01

## 2018-01-01 RX ORDER — SODIUM CHLORIDE 9 MG/ML
INJECTION, SOLUTION INTRAVENOUS CONTINUOUS
Status: DISCONTINUED | OUTPATIENT
Start: 2018-01-01 | End: 2018-01-01 | Stop reason: HOSPADM

## 2018-01-01 RX ORDER — ONDANSETRON 2 MG/ML
4 INJECTION INTRAMUSCULAR; INTRAVENOUS EVERY 6 HOURS PRN
Status: DISCONTINUED | OUTPATIENT
Start: 2018-01-01 | End: 2018-01-01 | Stop reason: HOSPADM

## 2018-01-01 RX ORDER — ISOSORBIDE MONONITRATE 30 MG/1
30 TABLET, EXTENDED RELEASE ORAL DAILY
Status: DISCONTINUED | OUTPATIENT
Start: 2018-01-01 | End: 2018-01-01 | Stop reason: HOSPADM

## 2018-01-01 RX ORDER — DEXTROSE MONOHYDRATE 50 MG/ML
100 INJECTION, SOLUTION INTRAVENOUS PRN
Status: DISCONTINUED | OUTPATIENT
Start: 2018-01-01 | End: 2018-01-01 | Stop reason: HOSPADM

## 2018-01-01 RX ORDER — CARVEDILOL 6.25 MG/1
12.5 TABLET ORAL 2 TIMES DAILY WITH MEALS
Status: DISCONTINUED | OUTPATIENT
Start: 2018-01-01 | End: 2018-01-01 | Stop reason: HOSPADM

## 2018-01-01 RX ORDER — POTASSIUM CHLORIDE 20 MEQ/1
40 TABLET, EXTENDED RELEASE ORAL ONCE
Status: COMPLETED | OUTPATIENT
Start: 2018-01-01 | End: 2018-01-01

## 2018-01-01 RX ORDER — LACTULOSE 10 G/15ML
20 SOLUTION ORAL 3 TIMES DAILY
Status: DISCONTINUED | OUTPATIENT
Start: 2018-01-01 | End: 2018-01-01 | Stop reason: HOSPADM

## 2018-01-01 RX ORDER — FUROSEMIDE 10 MG/ML
80 INJECTION INTRAMUSCULAR; INTRAVENOUS ONCE
Status: COMPLETED | OUTPATIENT
Start: 2018-01-01 | End: 2018-01-01

## 2018-01-01 RX ORDER — CHOLESTYRAMINE LIGHT 4 G/5.7G
4 POWDER, FOR SUSPENSION ORAL DAILY
Status: DISCONTINUED | OUTPATIENT
Start: 2018-01-01 | End: 2018-01-01

## 2018-01-01 RX ORDER — SPIRONOLACTONE 100 MG/1
100 TABLET, FILM COATED ORAL DAILY
COMMUNITY

## 2018-01-01 RX ORDER — ROPINIROLE 0.5 MG/1
0.5 TABLET, FILM COATED ORAL NIGHTLY
Qty: 90 TABLET | Refills: 3 | Status: SHIPPED | OUTPATIENT
Start: 2018-01-01

## 2018-01-01 RX ORDER — METOLAZONE 2.5 MG/1
2.5 TABLET ORAL DAILY
Status: DISCONTINUED | OUTPATIENT
Start: 2018-01-01 | End: 2018-01-01

## 2018-01-01 RX ORDER — FUROSEMIDE 40 MG/1
80 TABLET ORAL 2 TIMES DAILY
Status: DISCONTINUED | OUTPATIENT
Start: 2018-01-01 | End: 2018-01-01

## 2018-01-01 RX ORDER — FUROSEMIDE 40 MG/1
40 TABLET ORAL DAILY
Qty: 30 TABLET | Refills: 0
Start: 2018-01-01 | End: 2018-01-01

## 2018-01-01 RX ORDER — FENTANYL CITRATE 50 UG/ML
25 INJECTION, SOLUTION INTRAMUSCULAR; INTRAVENOUS ONCE
Status: DISCONTINUED | OUTPATIENT
Start: 2018-01-01 | End: 2018-01-01

## 2018-01-01 RX ORDER — LORAZEPAM 0.5 MG/1
0.5 TABLET ORAL NIGHTLY PRN
Status: DISCONTINUED | OUTPATIENT
Start: 2018-01-01 | End: 2018-01-01 | Stop reason: SDUPTHER

## 2018-01-01 RX ORDER — ALLOPURINOL 100 MG/1
100 TABLET ORAL 3 TIMES DAILY
COMMUNITY
End: 2018-01-01 | Stop reason: ALTCHOICE

## 2018-01-01 RX ORDER — HYDROXYZINE HYDROCHLORIDE 10 MG/1
10 TABLET, FILM COATED ORAL 3 TIMES DAILY PRN
Status: DISCONTINUED | OUTPATIENT
Start: 2018-01-01 | End: 2018-01-01 | Stop reason: HOSPADM

## 2018-01-01 RX ORDER — HYDRALAZINE HYDROCHLORIDE 10 MG/1
20 TABLET, FILM COATED ORAL EVERY 8 HOURS SCHEDULED
Status: DISCONTINUED | OUTPATIENT
Start: 2018-01-01 | End: 2018-01-01 | Stop reason: HOSPADM

## 2018-01-01 RX ORDER — HYDRALAZINE HYDROCHLORIDE 25 MG/1
25 TABLET, FILM COATED ORAL 2 TIMES DAILY
Status: DISCONTINUED | OUTPATIENT
Start: 2018-01-01 | End: 2018-01-01

## 2018-01-01 RX ORDER — FUROSEMIDE 20 MG/1
20 TABLET ORAL DAILY
Qty: 30 TABLET | Refills: 2 | Status: ON HOLD | OUTPATIENT
Start: 2018-01-01 | End: 2018-01-01 | Stop reason: HOSPADM

## 2018-01-01 RX ORDER — POTASSIUM CHLORIDE 20 MEQ/1
20 TABLET, EXTENDED RELEASE ORAL DAILY
Qty: 60 TABLET | Refills: 3 | Status: SHIPPED | OUTPATIENT
Start: 2018-01-01

## 2018-01-01 RX ORDER — INSULIN GLARGINE 100 [IU]/ML
10 INJECTION, SOLUTION SUBCUTANEOUS NIGHTLY
Status: DISCONTINUED | OUTPATIENT
Start: 2018-01-01 | End: 2018-01-01

## 2018-01-01 RX ORDER — ALBUMIN (HUMAN) 12.5 G/50ML
25 SOLUTION INTRAVENOUS ONCE
Status: COMPLETED | OUTPATIENT
Start: 2018-01-01 | End: 2018-01-01

## 2018-01-01 RX ORDER — POTASSIUM CHLORIDE 20 MEQ/1
20 TABLET, EXTENDED RELEASE ORAL 2 TIMES DAILY
Qty: 60 TABLET | Refills: 3 | Status: SHIPPED | OUTPATIENT
Start: 2018-01-01 | End: 2018-01-01

## 2018-01-01 RX ORDER — BUMETANIDE 1 MG/1
1 TABLET ORAL DAILY
Status: DISCONTINUED | OUTPATIENT
Start: 2018-01-01 | End: 2018-01-01

## 2018-01-01 RX ORDER — POLYETHYLENE GLYCOL 3350 17 G/17G
238 POWDER, FOR SOLUTION ORAL ONCE
Status: DISCONTINUED | OUTPATIENT
Start: 2018-01-01 | End: 2018-01-01

## 2018-01-01 RX ORDER — SODIUM CHLORIDE 0.9 % (FLUSH) 0.9 %
10 SYRINGE (ML) INJECTION PRN
Status: DISCONTINUED | OUTPATIENT
Start: 2018-01-01 | End: 2018-01-01 | Stop reason: HOSPADM

## 2018-01-01 RX ORDER — DOBUTAMINE HYDROCHLORIDE 200 MG/100ML
5 INJECTION INTRAVENOUS CONTINUOUS
Status: DISCONTINUED | OUTPATIENT
Start: 2018-01-01 | End: 2018-01-01

## 2018-01-01 RX ORDER — INSULIN GLARGINE 100 [IU]/ML
15 INJECTION, SOLUTION SUBCUTANEOUS
Status: DISCONTINUED | OUTPATIENT
Start: 2018-01-01 | End: 2018-01-01 | Stop reason: HOSPADM

## 2018-01-01 RX ORDER — INSULIN GLARGINE 100 [IU]/ML
13 INJECTION, SOLUTION SUBCUTANEOUS NIGHTLY
Status: DISCONTINUED | OUTPATIENT
Start: 2018-01-01 | End: 2018-01-01

## 2018-01-01 RX ORDER — TORSEMIDE 20 MG/1
40 TABLET ORAL 2 TIMES DAILY
Status: ON HOLD | COMMUNITY
End: 2018-01-01 | Stop reason: HOSPADM

## 2018-01-01 RX ORDER — ASPIRIN 81 MG/1
81 TABLET, CHEWABLE ORAL DAILY
COMMUNITY
End: 2018-01-01 | Stop reason: ALTCHOICE

## 2018-01-01 RX ORDER — ASPIRIN 81 MG/1
81 TABLET, CHEWABLE ORAL DAILY
Status: DISCONTINUED | OUTPATIENT
Start: 2018-01-01 | End: 2018-01-01

## 2018-01-01 RX ORDER — ASPIRIN 81 MG/1
324 TABLET, CHEWABLE ORAL ONCE
Status: COMPLETED | OUTPATIENT
Start: 2018-01-01 | End: 2018-01-01

## 2018-01-01 RX ORDER — GLIPIZIDE 5 MG/1
10 TABLET ORAL
Qty: 240 TABLET | Status: CANCELLED | OUTPATIENT
Start: 2018-01-01

## 2018-01-01 RX ORDER — TRAZODONE HYDROCHLORIDE 50 MG/1
25 TABLET ORAL NIGHTLY PRN
Status: DISCONTINUED | OUTPATIENT
Start: 2018-01-01 | End: 2018-01-01 | Stop reason: HOSPADM

## 2018-01-01 RX ORDER — HYDRALAZINE HYDROCHLORIDE 25 MG/1
25 TABLET, FILM COATED ORAL 2 TIMES DAILY
Status: ON HOLD | COMMUNITY
End: 2018-01-01 | Stop reason: HOSPADM

## 2018-01-01 RX ORDER — COLESEVELAM 180 1/1
1875 TABLET ORAL 2 TIMES DAILY WITH MEALS
Status: ON HOLD | COMMUNITY
End: 2018-01-01 | Stop reason: RX

## 2018-01-01 RX ORDER — LEVOTHYROXINE AND LIOTHYRONINE 38; 9 UG/1; UG/1
60 TABLET ORAL DAILY
Status: DISCONTINUED | OUTPATIENT
Start: 2018-01-01 | End: 2018-01-01 | Stop reason: HOSPADM

## 2018-01-01 RX ORDER — FLUMAZENIL 0.1 MG/ML
0.2 INJECTION, SOLUTION INTRAVENOUS ONCE
Status: COMPLETED | OUTPATIENT
Start: 2018-01-01 | End: 2018-01-01

## 2018-01-01 RX ORDER — FUROSEMIDE 10 MG/ML
INJECTION INTRAMUSCULAR; INTRAVENOUS
Status: DISCONTINUED
Start: 2018-01-01 | End: 2018-01-01

## 2018-01-01 RX ORDER — POTASSIUM CHLORIDE 7.45 MG/ML
10 INJECTION INTRAVENOUS
Status: COMPLETED | OUTPATIENT
Start: 2018-01-01 | End: 2018-01-01

## 2018-01-01 RX ORDER — HYDROXYZINE HYDROCHLORIDE 10 MG/1
10 TABLET, FILM COATED ORAL 3 TIMES DAILY PRN
Qty: 30 TABLET | Refills: 1 | Status: SHIPPED | OUTPATIENT
Start: 2018-01-01 | End: 2018-01-01

## 2018-01-01 RX ORDER — TRAZODONE HYDROCHLORIDE 50 MG/1
50 TABLET ORAL NIGHTLY
Status: DISCONTINUED | OUTPATIENT
Start: 2018-01-01 | End: 2018-01-01

## 2018-01-01 RX ORDER — DIGOXIN 125 MCG
125 TABLET ORAL
Status: DISCONTINUED | OUTPATIENT
Start: 2018-01-01 | End: 2018-01-01 | Stop reason: HOSPADM

## 2018-01-01 RX ORDER — ALBUMIN (HUMAN) 12.5 G/50ML
25 SOLUTION INTRAVENOUS
Status: COMPLETED | OUTPATIENT
Start: 2018-01-01 | End: 2018-01-01

## 2018-01-01 RX ORDER — ISOSORBIDE MONONITRATE 30 MG/1
30 TABLET, EXTENDED RELEASE ORAL DAILY
Qty: 30 TABLET | Refills: 3 | Status: SHIPPED | OUTPATIENT
Start: 2018-01-01

## 2018-01-01 RX ORDER — SPIRONOLACTONE 100 MG/1
100 TABLET, FILM COATED ORAL DAILY
Qty: 30 TABLET | Refills: 0
Start: 2018-01-01 | End: 2018-01-01

## 2018-01-01 RX ORDER — CARVEDILOL 25 MG/1
25 TABLET ORAL 2 TIMES DAILY WITH MEALS
Status: DISCONTINUED | OUTPATIENT
Start: 2018-01-01 | End: 2018-01-01 | Stop reason: HOSPADM

## 2018-01-01 RX ORDER — NITROFURANTOIN 25; 75 MG/1; MG/1
100 CAPSULE ORAL 2 TIMES DAILY
Qty: 10 CAPSULE | Refills: 0 | Status: ON HOLD | OUTPATIENT
Start: 2018-01-01 | End: 2018-01-01

## 2018-01-01 RX ORDER — BUMETANIDE 1 MG/1
1 TABLET ORAL 2 TIMES DAILY
Status: DISCONTINUED | OUTPATIENT
Start: 2018-01-01 | End: 2018-01-01 | Stop reason: HOSPADM

## 2018-01-01 RX ORDER — HYDRALAZINE HYDROCHLORIDE 10 MG/1
20 TABLET, FILM COATED ORAL EVERY 8 HOURS SCHEDULED
Qty: 90 TABLET | Refills: 3 | Status: ON HOLD | OUTPATIENT
Start: 2018-01-01 | End: 2018-01-01 | Stop reason: HOSPADM

## 2018-01-01 RX ORDER — CHOLESTYRAMINE LIGHT 4 G/5.7G
4 POWDER, FOR SUSPENSION ORAL DAILY
Qty: 60 PACKET | Refills: 3 | Status: SHIPPED | OUTPATIENT
Start: 2018-01-01

## 2018-01-01 RX ORDER — 0.9 % SODIUM CHLORIDE 0.9 %
250 INTRAVENOUS SOLUTION INTRAVENOUS ONCE
Status: DISCONTINUED | OUTPATIENT
Start: 2018-01-01 | End: 2018-01-01

## 2018-01-01 RX ORDER — 0.9 % SODIUM CHLORIDE 0.9 %
1000 INTRAVENOUS SOLUTION INTRAVENOUS ONCE
Status: COMPLETED | OUTPATIENT
Start: 2018-01-01 | End: 2018-01-01

## 2018-01-01 RX ORDER — LACTULOSE 10 G/15ML
10 SOLUTION ORAL DAILY
Status: DISCONTINUED | OUTPATIENT
Start: 2018-01-01 | End: 2018-01-01 | Stop reason: HOSPADM

## 2018-01-01 RX ORDER — CARVEDILOL 25 MG/1
25 TABLET ORAL 2 TIMES DAILY WITH MEALS
Status: DISCONTINUED | OUTPATIENT
Start: 2018-01-01 | End: 2018-01-01

## 2018-01-01 RX ORDER — BUMETANIDE 1 MG/1
1 TABLET ORAL 2 TIMES DAILY
Qty: 30 TABLET | Refills: 3 | Status: SHIPPED | OUTPATIENT
Start: 2018-01-01 | End: 2018-01-01 | Stop reason: SDUPTHER

## 2018-01-01 RX ORDER — BUMETANIDE 1 MG/1
1 TABLET ORAL 2 TIMES DAILY
Status: DISCONTINUED | OUTPATIENT
Start: 2018-01-01 | End: 2018-01-01

## 2018-01-01 RX ORDER — INSULIN GLARGINE 100 [IU]/ML
15 INJECTION, SOLUTION SUBCUTANEOUS NIGHTLY
Status: DISCONTINUED | OUTPATIENT
Start: 2018-01-01 | End: 2018-01-01 | Stop reason: HOSPADM

## 2018-01-01 RX ORDER — LORAZEPAM 0.5 MG/1
0.5 TABLET ORAL NIGHTLY PRN
Status: DISCONTINUED | OUTPATIENT
Start: 2018-01-01 | End: 2018-01-01

## 2018-01-01 RX ORDER — ALLOPURINOL 100 MG/1
100 TABLET ORAL DAILY
COMMUNITY

## 2018-01-01 RX ORDER — DIAPER,BRIEF,INFANT-TODD,DISP
EACH MISCELLANEOUS
Qty: 1 TUBE | Refills: 1 | Status: SHIPPED | OUTPATIENT
Start: 2018-01-01 | End: 2018-01-01

## 2018-01-01 RX ORDER — METOLAZONE 5 MG/1
10 TABLET ORAL ONCE
Status: COMPLETED | OUTPATIENT
Start: 2018-01-01 | End: 2018-01-01

## 2018-01-01 RX ORDER — INSULIN GLARGINE 100 [IU]/ML
10 INJECTION, SOLUTION SUBCUTANEOUS
Status: DISCONTINUED | OUTPATIENT
Start: 2018-01-01 | End: 2018-01-01

## 2018-01-01 RX ORDER — DIAPER,BRIEF,INFANT-TODD,DISP
EACH MISCELLANEOUS 2 TIMES DAILY PRN
Status: DISCONTINUED | OUTPATIENT
Start: 2018-01-01 | End: 2018-01-01 | Stop reason: HOSPADM

## 2018-01-01 RX ORDER — DIPHENHYDRAMINE HCL 25 MG
25 TABLET ORAL NIGHTLY
Status: DISCONTINUED | OUTPATIENT
Start: 2018-01-01 | End: 2018-01-01

## 2018-01-01 RX ORDER — CARVEDILOL 25 MG/1
25 TABLET ORAL 2 TIMES DAILY
Status: DISCONTINUED | OUTPATIENT
Start: 2018-01-01 | End: 2018-01-01

## 2018-01-01 RX ORDER — HYDRALAZINE HYDROCHLORIDE 10 MG/1
10 TABLET, FILM COATED ORAL EVERY 8 HOURS SCHEDULED
Status: DISCONTINUED | OUTPATIENT
Start: 2018-01-01 | End: 2018-01-01

## 2018-01-01 RX ORDER — DOXAZOSIN MESYLATE 4 MG/1
2 TABLET ORAL ONCE
Status: COMPLETED | OUTPATIENT
Start: 2018-01-01 | End: 2018-01-01

## 2018-01-01 RX ORDER — FUROSEMIDE 10 MG/ML
20 INJECTION INTRAMUSCULAR; INTRAVENOUS ONCE
Status: COMPLETED | OUTPATIENT
Start: 2018-01-01 | End: 2018-01-01

## 2018-01-01 RX ORDER — LACTULOSE 10 G/15ML
10 SOLUTION ORAL DAILY
COMMUNITY

## 2018-01-01 RX ORDER — CALCIUM GLUCONATE 94 MG/ML
1 INJECTION, SOLUTION INTRAVENOUS ONCE
Status: COMPLETED | OUTPATIENT
Start: 2018-01-01 | End: 2018-01-01

## 2018-01-01 RX ORDER — ROPINIROLE 0.25 MG/1
0.25 TABLET, FILM COATED ORAL ONCE
Status: COMPLETED | OUTPATIENT
Start: 2018-01-01 | End: 2018-01-01

## 2018-01-01 RX ORDER — PROMETHAZINE HYDROCHLORIDE 25 MG/ML
12.5 INJECTION, SOLUTION INTRAMUSCULAR; INTRAVENOUS ONCE
Status: COMPLETED | OUTPATIENT
Start: 2018-01-01 | End: 2018-01-01

## 2018-01-01 RX ORDER — ALBUMIN, HUMAN INJ 5% 5 %
12.5 SOLUTION INTRAVENOUS
Status: DISCONTINUED | OUTPATIENT
Start: 2018-01-01 | End: 2018-01-01

## 2018-01-01 RX ORDER — ASCORBIC ACID 500 MG
500 TABLET ORAL DAILY
Status: DISCONTINUED | OUTPATIENT
Start: 2018-01-01 | End: 2018-01-01 | Stop reason: HOSPADM

## 2018-01-01 RX ORDER — SPIRONOLACTONE 25 MG/1
50 TABLET ORAL DAILY
Qty: 30 TABLET | Refills: 0 | Status: SHIPPED | OUTPATIENT
Start: 2018-01-01 | End: 2018-01-01

## 2018-01-01 RX ORDER — CARVEDILOL 12.5 MG/1
12.5 TABLET ORAL 2 TIMES DAILY WITH MEALS
Qty: 60 TABLET | Refills: 3 | Status: SHIPPED | OUTPATIENT
Start: 2018-01-01

## 2018-01-01 RX ORDER — POTASSIUM CHLORIDE 20 MEQ/1
20 TABLET, EXTENDED RELEASE ORAL 2 TIMES DAILY WITH MEALS
Status: DISCONTINUED | OUTPATIENT
Start: 2018-01-01 | End: 2018-01-01

## 2018-01-01 RX ORDER — LEVOTHYROXINE SODIUM 88 UG/1
88 TABLET ORAL DAILY
Status: DISCONTINUED | OUTPATIENT
Start: 2018-01-01 | End: 2018-01-01 | Stop reason: HOSPADM

## 2018-01-01 RX ORDER — ISOSORBIDE MONONITRATE 30 MG/1
15 TABLET, EXTENDED RELEASE ORAL DAILY
Status: DISCONTINUED | OUTPATIENT
Start: 2018-01-01 | End: 2018-01-01

## 2018-01-01 RX ORDER — POTASSIUM CHLORIDE 20 MEQ/1
40 TABLET, EXTENDED RELEASE ORAL 2 TIMES DAILY
Status: DISCONTINUED | OUTPATIENT
Start: 2018-01-01 | End: 2018-01-01

## 2018-01-01 RX ORDER — 0.9 % SODIUM CHLORIDE 0.9 %
250 INTRAVENOUS SOLUTION INTRAVENOUS ONCE
Status: COMPLETED | OUTPATIENT
Start: 2018-01-01 | End: 2018-01-01

## 2018-01-01 RX ORDER — ALLOPURINOL 100 MG/1
100 TABLET ORAL DAILY
Qty: 30 TABLET | Refills: 3 | Status: SHIPPED | OUTPATIENT
Start: 2018-01-01 | End: 2018-01-01 | Stop reason: HOSPADM

## 2018-01-01 RX ORDER — DOXAZOSIN MESYLATE 4 MG/1
4 TABLET ORAL NIGHTLY
Status: DISCONTINUED | OUTPATIENT
Start: 2018-01-01 | End: 2018-01-01

## 2018-01-01 RX ORDER — POTASSIUM CHLORIDE 20 MEQ/1
20 TABLET, EXTENDED RELEASE ORAL ONCE
Status: COMPLETED | OUTPATIENT
Start: 2018-01-01 | End: 2018-01-01

## 2018-01-01 RX ORDER — BUMETANIDE 1 MG/1
1 TABLET ORAL 2 TIMES DAILY
Qty: 60 TABLET | Refills: 3 | Status: SHIPPED | OUTPATIENT
Start: 2018-01-01

## 2018-01-01 RX ADMIN — TRAZODONE HYDROCHLORIDE 25 MG: 50 TABLET ORAL at 20:57

## 2018-01-01 RX ADMIN — THERA TABS 1 TABLET: TAB at 09:14

## 2018-01-01 RX ADMIN — INSULIN LISPRO 2 UNITS: 100 INJECTION, SOLUTION INTRAVENOUS; SUBCUTANEOUS at 13:16

## 2018-01-01 RX ADMIN — INSULIN LISPRO 1 UNITS: 100 INJECTION, SOLUTION INTRAVENOUS; SUBCUTANEOUS at 11:39

## 2018-01-01 RX ADMIN — HYDROXYZINE HYDROCHLORIDE 10 MG: 10 TABLET ORAL at 22:27

## 2018-01-01 RX ADMIN — HYDROCORTISONE ACETATE 25 MG: 25 SUPPOSITORY RECTAL at 21:01

## 2018-01-01 RX ADMIN — LACTULOSE 20 G: 20 SOLUTION ORAL at 10:29

## 2018-01-01 RX ADMIN — HYDRALAZINE HYDROCHLORIDE 20 MG: 10 TABLET, FILM COATED ORAL at 06:36

## 2018-01-01 RX ADMIN — RIFAXIMIN 550 MG: 550 TABLET ORAL at 20:34

## 2018-01-01 RX ADMIN — INSULIN LISPRO 4 UNITS: 100 INJECTION, SOLUTION INTRAVENOUS; SUBCUTANEOUS at 17:50

## 2018-01-01 RX ADMIN — THERA TABS 1 TABLET: TAB at 08:05

## 2018-01-01 RX ADMIN — POTASSIUM CHLORIDE 10 MEQ: 10 INJECTION, SOLUTION INTRAVENOUS at 10:24

## 2018-01-01 RX ADMIN — Medication 500 MG: at 09:46

## 2018-01-01 RX ADMIN — GLIPIZIDE 2.5 MG: 5 TABLET ORAL at 06:35

## 2018-01-01 RX ADMIN — PANTOPRAZOLE SODIUM 40 MG: 40 INJECTION, POWDER, FOR SOLUTION INTRAVENOUS at 09:22

## 2018-01-01 RX ADMIN — POTASSIUM CHLORIDE 10 MEQ: 10 INJECTION, SOLUTION INTRAVENOUS at 12:18

## 2018-01-01 RX ADMIN — INSULIN GLARGINE 15 UNITS: 100 INJECTION, SOLUTION SUBCUTANEOUS at 09:01

## 2018-01-01 RX ADMIN — CARVEDILOL 25 MG: 25 TABLET, FILM COATED ORAL at 08:05

## 2018-01-01 RX ADMIN — THERA TABS 1 TABLET: TAB at 09:15

## 2018-01-01 RX ADMIN — INSULIN GLARGINE 15 UNITS: 100 INJECTION, SOLUTION SUBCUTANEOUS at 09:33

## 2018-01-01 RX ADMIN — CARVEDILOL 25 MG: 25 TABLET, FILM COATED ORAL at 09:21

## 2018-01-01 RX ADMIN — Medication 10 ML: at 23:23

## 2018-01-01 RX ADMIN — INSULIN LISPRO 2 UNITS: 100 INJECTION, SOLUTION INTRAVENOUS; SUBCUTANEOUS at 17:51

## 2018-01-01 RX ADMIN — PANTOPRAZOLE SODIUM 40 MG: 40 TABLET, DELAYED RELEASE ORAL at 17:51

## 2018-01-01 RX ADMIN — CARVEDILOL 12.5 MG: 6.25 TABLET, FILM COATED ORAL at 17:05

## 2018-01-01 RX ADMIN — PANTOPRAZOLE SODIUM 40 MG: 40 TABLET, DELAYED RELEASE ORAL at 16:05

## 2018-01-01 RX ADMIN — VITAMIN D, TAB 1000IU (100/BT) 1000 UNITS: 25 TAB at 09:21

## 2018-01-01 RX ADMIN — Medication 2 UNITS: at 21:48

## 2018-01-01 RX ADMIN — CARVEDILOL 12.5 MG: 6.25 TABLET, FILM COATED ORAL at 10:18

## 2018-01-01 RX ADMIN — CARVEDILOL 12.5 MG: 6.25 TABLET, FILM COATED ORAL at 09:15

## 2018-01-01 RX ADMIN — APIXABAN 2.5 MG: 2.5 TABLET, FILM COATED ORAL at 08:17

## 2018-01-01 RX ADMIN — HYDRALAZINE HYDROCHLORIDE 10 MG: 10 TABLET, FILM COATED ORAL at 09:02

## 2018-01-01 RX ADMIN — ISOSORBIDE MONONITRATE 30 MG: 30 TABLET ORAL at 08:42

## 2018-01-01 RX ADMIN — DIGOXIN 125 MCG: 125 TABLET ORAL at 09:30

## 2018-01-01 RX ADMIN — VITAMIN D, TAB 1000IU (100/BT) 1000 UNITS: 25 TAB at 09:13

## 2018-01-01 RX ADMIN — VITAMIN D, TAB 1000IU (100/BT) 1000 UNITS: 25 TAB at 08:17

## 2018-01-01 RX ADMIN — FUROSEMIDE 80 MG: 10 INJECTION, SOLUTION INTRAMUSCULAR; INTRAVENOUS at 16:05

## 2018-01-01 RX ADMIN — RIFAXIMIN 550 MG: 550 TABLET ORAL at 08:17

## 2018-01-01 RX ADMIN — INSULIN LISPRO 1 UNITS: 100 INJECTION, SOLUTION INTRAVENOUS; SUBCUTANEOUS at 12:20

## 2018-01-01 RX ADMIN — APIXABAN 2.5 MG: 2.5 TABLET, FILM COATED ORAL at 20:26

## 2018-01-01 RX ADMIN — BUMETANIDE 1 MG: 1 TABLET ORAL at 17:11

## 2018-01-01 RX ADMIN — APIXABAN 2.5 MG: 2.5 TABLET, FILM COATED ORAL at 20:35

## 2018-01-01 RX ADMIN — PANTOPRAZOLE SODIUM 40 MG: 40 INJECTION, POWDER, FOR SOLUTION INTRAVENOUS at 09:21

## 2018-01-01 RX ADMIN — FLUOXETINE HYDROCHLORIDE 20 MG: 20 CAPSULE ORAL at 08:17

## 2018-01-01 RX ADMIN — INSULIN LISPRO 4 UNITS: 100 INJECTION, SOLUTION INTRAVENOUS; SUBCUTANEOUS at 12:41

## 2018-01-01 RX ADMIN — Medication 10 ML: at 09:03

## 2018-01-01 RX ADMIN — INSULIN LISPRO 5 UNITS: 100 INJECTION, SOLUTION INTRAVENOUS; SUBCUTANEOUS at 17:20

## 2018-01-01 RX ADMIN — CARVEDILOL 25 MG: 25 TABLET, FILM COATED ORAL at 08:09

## 2018-01-01 RX ADMIN — LEVOTHYROXINE SODIUM 88 MCG: 88 TABLET ORAL at 06:00

## 2018-01-01 RX ADMIN — HYDROCORTISONE: 1 CREAM TOPICAL at 15:10

## 2018-01-01 RX ADMIN — CARVEDILOL 25 MG: 25 TABLET, FILM COATED ORAL at 09:10

## 2018-01-01 RX ADMIN — LACTULOSE 20 G: 20 SOLUTION ORAL at 21:08

## 2018-01-01 RX ADMIN — PROMETHAZINE HYDROCHLORIDE 12.5 MG: 25 INJECTION INTRAMUSCULAR; INTRAVENOUS at 18:57

## 2018-01-01 RX ADMIN — FUROSEMIDE 80 MG: 10 INJECTION, SOLUTION INTRAMUSCULAR; INTRAVENOUS at 09:24

## 2018-01-01 RX ADMIN — SODIUM CHLORIDE: 9 INJECTION, SOLUTION INTRAVENOUS at 15:56

## 2018-01-01 RX ADMIN — CARVEDILOL 25 MG: 25 TABLET, FILM COATED ORAL at 08:42

## 2018-01-01 RX ADMIN — RIFAXIMIN 550 MG: 550 TABLET ORAL at 20:32

## 2018-01-01 RX ADMIN — LEVOTHYROXINE SODIUM 88 MCG: 88 TABLET ORAL at 05:28

## 2018-01-01 RX ADMIN — CARVEDILOL 25 MG: 25 TABLET, FILM COATED ORAL at 09:13

## 2018-01-01 RX ADMIN — CHOLESTYRAMINE 4 G: 4 POWDER, FOR SUSPENSION ORAL at 09:03

## 2018-01-01 RX ADMIN — CEFTRIAXONE SODIUM 1 G: 1 INJECTION, POWDER, FOR SOLUTION INTRAMUSCULAR; INTRAVENOUS at 00:06

## 2018-01-01 RX ADMIN — FUROSEMIDE 80 MG: 40 TABLET ORAL at 10:18

## 2018-01-01 RX ADMIN — Medication 500 MG: at 20:48

## 2018-01-01 RX ADMIN — HYDROCORTISONE ACETATE 25 MG: 25 SUPPOSITORY RECTAL at 15:10

## 2018-01-01 RX ADMIN — Medication 500 MG: at 09:14

## 2018-01-01 RX ADMIN — LACTULOSE 20 G: 20 SOLUTION ORAL at 09:41

## 2018-01-01 RX ADMIN — INSULIN LISPRO 4 UNITS: 100 INJECTION, SOLUTION INTRAVENOUS; SUBCUTANEOUS at 12:22

## 2018-01-01 RX ADMIN — LEVOTHYROXINE SODIUM 88 MCG: 88 TABLET ORAL at 06:32

## 2018-01-01 RX ADMIN — APIXABAN 2.5 MG: 2.5 TABLET, FILM COATED ORAL at 21:45

## 2018-01-01 RX ADMIN — RIFAXIMIN 550 MG: 550 TABLET ORAL at 09:15

## 2018-01-01 RX ADMIN — SODIUM CHLORIDE 250 ML: 9 INJECTION, SOLUTION INTRAVENOUS at 00:36

## 2018-01-01 RX ADMIN — POTASSIUM CHLORIDE 10 MEQ: 7.46 INJECTION, SOLUTION INTRAVENOUS at 09:23

## 2018-01-01 RX ADMIN — HYDRALAZINE HYDROCHLORIDE 10 MG: 10 TABLET, FILM COATED ORAL at 05:29

## 2018-01-01 RX ADMIN — GLIPIZIDE 2.5 MG: 5 TABLET ORAL at 06:27

## 2018-01-01 RX ADMIN — RIFAXIMIN 550 MG: 550 TABLET ORAL at 22:03

## 2018-01-01 RX ADMIN — CARVEDILOL 12.5 MG: 6.25 TABLET, FILM COATED ORAL at 16:14

## 2018-01-01 RX ADMIN — CARVEDILOL 25 MG: 25 TABLET, FILM COATED ORAL at 17:42

## 2018-01-01 RX ADMIN — FLUOXETINE HYDROCHLORIDE 20 MG: 20 CAPSULE ORAL at 20:48

## 2018-01-01 RX ADMIN — CARVEDILOL 25 MG: 25 TABLET, FILM COATED ORAL at 16:38

## 2018-01-01 RX ADMIN — FLUMAZENIL 0.2 MG: 0.1 INJECTION, SOLUTION INTRAVENOUS at 11:34

## 2018-01-01 RX ADMIN — SODIUM CHLORIDE 1000 ML: 9 INJECTION, SOLUTION INTRAVENOUS at 17:39

## 2018-01-01 RX ADMIN — CARVEDILOL 25 MG: 25 TABLET, FILM COATED ORAL at 20:32

## 2018-01-01 RX ADMIN — LACTULOSE 20 G: 20 SOLUTION ORAL at 13:53

## 2018-01-01 RX ADMIN — Medication 500 MG: at 09:21

## 2018-01-01 RX ADMIN — METOLAZONE 5 MG: 5 TABLET ORAL at 13:19

## 2018-01-01 RX ADMIN — CEFTRIAXONE SODIUM 1 G: 1 INJECTION, POWDER, FOR SOLUTION INTRAMUSCULAR; INTRAVENOUS at 23:27

## 2018-01-01 RX ADMIN — CARVEDILOL 25 MG: 25 TABLET, FILM COATED ORAL at 09:46

## 2018-01-01 RX ADMIN — LEVOTHYROXINE SODIUM 88 MCG: 88 TABLET ORAL at 05:58

## 2018-01-01 RX ADMIN — INSULIN GLARGINE 10 UNITS: 100 INJECTION, SOLUTION SUBCUTANEOUS at 22:11

## 2018-01-01 RX ADMIN — INSULIN LISPRO 5 UNITS: 100 INJECTION, SOLUTION INTRAVENOUS; SUBCUTANEOUS at 16:26

## 2018-01-01 RX ADMIN — RIFAXIMIN 550 MG: 550 TABLET ORAL at 20:53

## 2018-01-01 RX ADMIN — CHOLESTYRAMINE 4 G: 4 POWDER, FOR SUSPENSION ORAL at 09:42

## 2018-01-01 RX ADMIN — LACTULOSE 20 G: 20 SOLUTION ORAL at 17:28

## 2018-01-01 RX ADMIN — INSULIN LISPRO 2 UNITS: 100 INJECTION, SOLUTION INTRAVENOUS; SUBCUTANEOUS at 09:20

## 2018-01-01 RX ADMIN — INSULIN LISPRO 6 UNITS: 100 INJECTION, SOLUTION INTRAVENOUS; SUBCUTANEOUS at 09:47

## 2018-01-01 RX ADMIN — CEFTRIAXONE SODIUM 1 G: 1 INJECTION, POWDER, FOR SOLUTION INTRAMUSCULAR; INTRAVENOUS at 23:04

## 2018-01-01 RX ADMIN — LEVOTHYROXINE, LIOTHYRONINE 60 MG: 38; 9 TABLET ORAL at 06:27

## 2018-01-01 RX ADMIN — LACTULOSE 20 G: 20 SOLUTION ORAL at 12:58

## 2018-01-01 RX ADMIN — APIXABAN 2.5 MG: 2.5 TABLET, FILM COATED ORAL at 09:30

## 2018-01-01 RX ADMIN — APIXABAN 2.5 MG: 2.5 TABLET, FILM COATED ORAL at 20:32

## 2018-01-01 RX ADMIN — RIFAXIMIN 550 MG: 550 TABLET ORAL at 21:20

## 2018-01-01 RX ADMIN — ROPINIROLE HYDROCHLORIDE 0.5 MG: 0.5 TABLET, FILM COATED ORAL at 20:38

## 2018-01-01 RX ADMIN — Medication 10 ML: at 09:11

## 2018-01-01 RX ADMIN — CEFTRIAXONE SODIUM 1 G: 1 INJECTION, POWDER, FOR SOLUTION INTRAMUSCULAR; INTRAVENOUS at 23:58

## 2018-01-01 RX ADMIN — Medication 500 MG: at 10:05

## 2018-01-01 RX ADMIN — CEFTRIAXONE SODIUM 1 G: 1 INJECTION, POWDER, FOR SOLUTION INTRAMUSCULAR; INTRAVENOUS at 01:14

## 2018-01-01 RX ADMIN — ISOSORBIDE MONONITRATE 30 MG: 30 TABLET ORAL at 10:18

## 2018-01-01 RX ADMIN — FUROSEMIDE 80 MG: 10 INJECTION, SOLUTION INTRAMUSCULAR; INTRAVENOUS at 09:47

## 2018-01-01 RX ADMIN — PANTOPRAZOLE SODIUM 40 MG: 40 TABLET, DELAYED RELEASE ORAL at 17:43

## 2018-01-01 RX ADMIN — LACTULOSE 20 G: 20 SOLUTION ORAL at 13:57

## 2018-01-01 RX ADMIN — CARVEDILOL 25 MG: 25 TABLET, FILM COATED ORAL at 16:22

## 2018-01-01 RX ADMIN — TRAZODONE HYDROCHLORIDE 25 MG: 50 TABLET ORAL at 21:10

## 2018-01-01 RX ADMIN — ISOSORBIDE MONONITRATE 15 MG: 30 TABLET ORAL at 09:03

## 2018-01-01 RX ADMIN — APIXABAN 2.5 MG: 2.5 TABLET, FILM COATED ORAL at 09:18

## 2018-01-01 RX ADMIN — LEVOTHYROXINE, LIOTHYRONINE 60 MG: 38; 9 TABLET ORAL at 06:19

## 2018-01-01 RX ADMIN — LACTULOSE 10 G: 20 SOLUTION ORAL at 09:20

## 2018-01-01 RX ADMIN — POTASSIUM CHLORIDE 10 MEQ: 10 INJECTION, SOLUTION INTRAVENOUS at 11:18

## 2018-01-01 RX ADMIN — CARVEDILOL 25 MG: 25 TABLET, FILM COATED ORAL at 01:05

## 2018-01-01 RX ADMIN — APIXABAN 2.5 MG: 2.5 TABLET, FILM COATED ORAL at 21:20

## 2018-01-01 RX ADMIN — INSULIN GLARGINE 10 UNITS: 100 INJECTION, SOLUTION SUBCUTANEOUS at 21:28

## 2018-01-01 RX ADMIN — DOBUTAMINE HYDROCHLORIDE 2.5 MCG/KG/MIN: 200 INJECTION INTRAVENOUS at 04:12

## 2018-01-01 RX ADMIN — INSULIN LISPRO 2 UNITS: 100 INJECTION, SOLUTION INTRAVENOUS; SUBCUTANEOUS at 10:06

## 2018-01-01 RX ADMIN — VITAMIN D, TAB 1000IU (100/BT) 1000 UNITS: 25 TAB at 20:48

## 2018-01-01 RX ADMIN — DOBUTAMINE HYDROCHLORIDE 5 MCG/KG/MIN: 200 INJECTION INTRAVENOUS at 21:21

## 2018-01-01 RX ADMIN — LACTULOSE 10 G: 20 SOLUTION ORAL at 08:16

## 2018-01-01 RX ADMIN — ASPIRIN 81 MG 324 MG: 81 TABLET ORAL at 02:19

## 2018-01-01 RX ADMIN — INSULIN LISPRO 2 UNITS: 100 INJECTION, SOLUTION INTRAVENOUS; SUBCUTANEOUS at 08:10

## 2018-01-01 RX ADMIN — RIFAXIMIN 550 MG: 550 TABLET ORAL at 20:38

## 2018-01-01 RX ADMIN — INSULIN LISPRO 4 UNITS: 100 INJECTION, SOLUTION INTRAVENOUS; SUBCUTANEOUS at 17:00

## 2018-01-01 RX ADMIN — INSULIN GLARGINE 10 UNITS: 100 INJECTION, SOLUTION SUBCUTANEOUS at 21:05

## 2018-01-01 RX ADMIN — CARVEDILOL 25 MG: 25 TABLET, FILM COATED ORAL at 17:20

## 2018-01-01 RX ADMIN — CARVEDILOL 25 MG: 25 TABLET, FILM COATED ORAL at 17:29

## 2018-01-01 RX ADMIN — CARVEDILOL 25 MG: 25 TABLET, FILM COATED ORAL at 21:01

## 2018-01-01 RX ADMIN — ALBUTEROL SULFATE 2.5 MG: 2.5 SOLUTION RESPIRATORY (INHALATION) at 10:37

## 2018-01-01 RX ADMIN — THERA TABS 1 TABLET: TAB at 20:48

## 2018-01-01 RX ADMIN — PANTOPRAZOLE SODIUM 40 MG: 40 TABLET, DELAYED RELEASE ORAL at 06:41

## 2018-01-01 RX ADMIN — CEFTRIAXONE SODIUM 1 G: 1 INJECTION, POWDER, FOR SOLUTION INTRAMUSCULAR; INTRAVENOUS at 23:20

## 2018-01-01 RX ADMIN — FLUOXETINE HYDROCHLORIDE 20 MG: 20 CAPSULE ORAL at 08:34

## 2018-01-01 RX ADMIN — HYDRALAZINE HYDROCHLORIDE 20 MG: 10 TABLET, FILM COATED ORAL at 15:56

## 2018-01-01 RX ADMIN — DOBUTAMINE HYDROCHLORIDE 5 MCG/KG/MIN: 200 INJECTION INTRAVENOUS at 05:07

## 2018-01-01 RX ADMIN — Medication 1 UNITS: at 20:11

## 2018-01-01 RX ADMIN — LACTULOSE 20 G: 20 SOLUTION ORAL at 20:15

## 2018-01-01 RX ADMIN — ALBUMIN (HUMAN) 25 G: 0.25 INJECTION, SOLUTION INTRAVENOUS at 20:31

## 2018-01-01 RX ADMIN — CARVEDILOL 25 MG: 25 TABLET, FILM COATED ORAL at 15:57

## 2018-01-01 RX ADMIN — APIXABAN 2.5 MG: 2.5 TABLET, FILM COATED ORAL at 22:03

## 2018-01-01 RX ADMIN — ALBUMIN (HUMAN) 25 G: 0.25 INJECTION, SOLUTION INTRAVENOUS at 13:35

## 2018-01-01 RX ADMIN — FLUOXETINE HYDROCHLORIDE 20 MG: 20 CAPSULE ORAL at 09:10

## 2018-01-01 RX ADMIN — TRAZODONE HYDROCHLORIDE 25 MG: 50 TABLET ORAL at 21:45

## 2018-01-01 RX ADMIN — APIXABAN 2.5 MG: 2.5 TABLET, FILM COATED ORAL at 09:10

## 2018-01-01 RX ADMIN — LACTULOSE 10 G: 20 SOLUTION ORAL at 09:13

## 2018-01-01 RX ADMIN — DOBUTAMINE HYDROCHLORIDE 2.5 MCG/KG/MIN: 200 INJECTION INTRAVENOUS at 09:28

## 2018-01-01 RX ADMIN — THERA TABS 1 TABLET: TAB at 08:42

## 2018-01-01 RX ADMIN — RIFAXIMIN 550 MG: 550 TABLET ORAL at 20:59

## 2018-01-01 RX ADMIN — INSULIN HUMAN 5 UNITS: 100 INJECTION, SOLUTION PARENTERAL at 19:20

## 2018-01-01 RX ADMIN — LACTULOSE 20 G: 20 SOLUTION ORAL at 20:35

## 2018-01-01 RX ADMIN — LEVOTHYROXINE, LIOTHYRONINE 60 MG: 38; 9 TABLET ORAL at 06:05

## 2018-01-01 RX ADMIN — Medication 500 MG: at 08:09

## 2018-01-01 RX ADMIN — HYDRALAZINE HYDROCHLORIDE 20 MG: 10 TABLET, FILM COATED ORAL at 15:13

## 2018-01-01 RX ADMIN — FUROSEMIDE 20 MG: 10 INJECTION, SOLUTION INTRAMUSCULAR; INTRAVENOUS at 01:06

## 2018-01-01 RX ADMIN — HYDROCORTISONE ACETATE 25 MG: 25 SUPPOSITORY RECTAL at 09:46

## 2018-01-01 RX ADMIN — ROPINIROLE 0.25 MG: 0.25 TABLET, FILM COATED ORAL at 03:16

## 2018-01-01 RX ADMIN — FUROSEMIDE 80 MG: 10 INJECTION, SOLUTION INTRAMUSCULAR; INTRAVENOUS at 17:43

## 2018-01-01 RX ADMIN — POTASSIUM CHLORIDE 20 MEQ: 20 TABLET, EXTENDED RELEASE ORAL at 08:38

## 2018-01-01 RX ADMIN — CARVEDILOL 25 MG: 25 TABLET, FILM COATED ORAL at 20:34

## 2018-01-01 RX ADMIN — VITAMIN D, TAB 1000IU (100/BT) 1000 UNITS: 25 TAB at 10:17

## 2018-01-01 RX ADMIN — CEFTRIAXONE SODIUM 1 G: 1 INJECTION, POWDER, FOR SOLUTION INTRAMUSCULAR; INTRAVENOUS at 00:53

## 2018-01-01 RX ADMIN — Medication 10 ML: at 08:38

## 2018-01-01 RX ADMIN — HYDRALAZINE HYDROCHLORIDE 25 MG: 25 TABLET, FILM COATED ORAL at 21:10

## 2018-01-01 RX ADMIN — PANTOPRAZOLE SODIUM 40 MG: 40 INJECTION, POWDER, FOR SOLUTION INTRAVENOUS at 08:06

## 2018-01-01 RX ADMIN — ISOSORBIDE MONONITRATE 30 MG: 30 TABLET ORAL at 09:30

## 2018-01-01 RX ADMIN — PANTOPRAZOLE SODIUM 40 MG: 40 TABLET, DELAYED RELEASE ORAL at 06:32

## 2018-01-01 RX ADMIN — PANTOPRAZOLE SODIUM 40 MG: 40 INJECTION, POWDER, FOR SOLUTION INTRAVENOUS at 05:59

## 2018-01-01 RX ADMIN — LEVOTHYROXINE SODIUM 88 MCG: 88 TABLET ORAL at 06:41

## 2018-01-01 RX ADMIN — TRAZODONE HYDROCHLORIDE 25 MG: 50 TABLET ORAL at 20:38

## 2018-01-01 RX ADMIN — HYDRALAZINE HYDROCHLORIDE 10 MG: 10 TABLET, FILM COATED ORAL at 21:47

## 2018-01-01 RX ADMIN — ISOSORBIDE MONONITRATE 30 MG: 30 TABLET ORAL at 09:21

## 2018-01-01 RX ADMIN — LACTULOSE 20 G: 20 SOLUTION ORAL at 21:45

## 2018-01-01 RX ADMIN — ALLOPURINOL 100 MG: 100 TABLET ORAL at 10:18

## 2018-01-01 RX ADMIN — LEVOTHYROXINE SODIUM 88 MCG: 88 TABLET ORAL at 06:52

## 2018-01-01 RX ADMIN — ONDANSETRON 4 MG: 2 INJECTION INTRAMUSCULAR; INTRAVENOUS at 22:01

## 2018-01-01 RX ADMIN — THERA TABS 1 TABLET: TAB at 10:05

## 2018-01-01 RX ADMIN — LACTULOSE 10 G: 20 SOLUTION ORAL at 08:31

## 2018-01-01 RX ADMIN — INSULIN LISPRO 5 UNITS: 100 INJECTION, SOLUTION INTRAVENOUS; SUBCUTANEOUS at 12:18

## 2018-01-01 RX ADMIN — POTASSIUM CHLORIDE 40 MEQ: 750 TABLET, FILM COATED, EXTENDED RELEASE ORAL at 09:41

## 2018-01-01 RX ADMIN — FLUOXETINE HYDROCHLORIDE 20 MG: 20 CAPSULE ORAL at 10:27

## 2018-01-01 RX ADMIN — INSULIN LISPRO 5 UNITS: 100 INJECTION, SOLUTION INTRAVENOUS; SUBCUTANEOUS at 09:02

## 2018-01-01 RX ADMIN — DOXAZOSIN 2 MG: 4 TABLET ORAL at 00:36

## 2018-01-01 RX ADMIN — CARVEDILOL 25 MG: 25 TABLET, FILM COATED ORAL at 08:18

## 2018-01-01 RX ADMIN — FUROSEMIDE 80 MG: 10 INJECTION, SOLUTION INTRAMUSCULAR; INTRAVENOUS at 08:43

## 2018-01-01 RX ADMIN — FLUOXETINE HYDROCHLORIDE 20 MG: 20 CAPSULE ORAL at 09:13

## 2018-01-01 RX ADMIN — SODIUM CHLORIDE: 9 INJECTION, SOLUTION INTRAVENOUS at 10:29

## 2018-01-01 RX ADMIN — ISOSORBIDE MONONITRATE 30 MG: 30 TABLET ORAL at 09:18

## 2018-01-01 RX ADMIN — GLIPIZIDE 2.5 MG: 5 TABLET ORAL at 09:11

## 2018-01-01 RX ADMIN — Medication 10 ML: at 10:29

## 2018-01-01 RX ADMIN — APIXABAN 2.5 MG: 2.5 TABLET, FILM COATED ORAL at 20:43

## 2018-01-01 RX ADMIN — HYDRALAZINE HYDROCHLORIDE 20 MG: 10 TABLET, FILM COATED ORAL at 15:16

## 2018-01-01 RX ADMIN — HYDRALAZINE HYDROCHLORIDE 20 MG: 10 TABLET, FILM COATED ORAL at 14:57

## 2018-01-01 RX ADMIN — LEVOTHYROXINE SODIUM 88 MCG: 88 TABLET ORAL at 05:55

## 2018-01-01 RX ADMIN — CARVEDILOL 25 MG: 25 TABLET, FILM COATED ORAL at 20:44

## 2018-01-01 RX ADMIN — RIFAXIMIN 550 MG: 550 TABLET ORAL at 20:43

## 2018-01-01 RX ADMIN — LACTULOSE 20 G: 20 SOLUTION ORAL at 21:10

## 2018-01-01 RX ADMIN — HYDROCORTISONE ACETATE 25 MG: 25 SUPPOSITORY RECTAL at 08:09

## 2018-01-01 RX ADMIN — HYDRALAZINE HYDROCHLORIDE 20 MG: 10 TABLET, FILM COATED ORAL at 09:11

## 2018-01-01 RX ADMIN — ISOSORBIDE MONONITRATE 15 MG: 30 TABLET ORAL at 11:41

## 2018-01-01 RX ADMIN — FLUOXETINE HYDROCHLORIDE 20 MG: 20 CAPSULE ORAL at 09:15

## 2018-01-01 RX ADMIN — INSULIN GLARGINE 13 UNITS: 100 INJECTION, SOLUTION SUBCUTANEOUS at 20:44

## 2018-01-01 RX ADMIN — ISOSORBIDE MONONITRATE 30 MG: 30 TABLET ORAL at 08:05

## 2018-01-01 RX ADMIN — RIFAXIMIN 550 MG: 550 TABLET ORAL at 08:05

## 2018-01-01 RX ADMIN — CHOLESTYRAMINE 4 G: 4 POWDER, FOR SUSPENSION ORAL at 08:09

## 2018-01-01 RX ADMIN — APIXABAN 5 MG: 5 TABLET, FILM COATED ORAL at 23:53

## 2018-01-01 RX ADMIN — CHOLESTYRAMINE 4 G: 4 POWDER, FOR SUSPENSION ORAL at 08:34

## 2018-01-01 RX ADMIN — PANTOPRAZOLE SODIUM 40 MG: 40 TABLET, DELAYED RELEASE ORAL at 06:52

## 2018-01-01 RX ADMIN — INSULIN GLARGINE 15 UNITS: 100 INJECTION, SOLUTION SUBCUTANEOUS at 21:50

## 2018-01-01 RX ADMIN — POTASSIUM CHLORIDE 40 MEQ: 750 TABLET, FILM COATED, EXTENDED RELEASE ORAL at 08:42

## 2018-01-01 RX ADMIN — ROPINIROLE HYDROCHLORIDE 0.5 MG: 0.5 TABLET, FILM COATED ORAL at 20:43

## 2018-01-01 RX ADMIN — CARVEDILOL 25 MG: 25 TABLET, FILM COATED ORAL at 09:18

## 2018-01-01 RX ADMIN — PANTOPRAZOLE SODIUM 40 MG: 40 INJECTION, POWDER, FOR SOLUTION INTRAVENOUS at 00:13

## 2018-01-01 RX ADMIN — CARVEDILOL 25 MG: 25 TABLET, FILM COATED ORAL at 20:48

## 2018-01-01 RX ADMIN — SODIUM POLYSTYRENE SULFONATE 15 G: 15 SUSPENSION ORAL; RECTAL at 20:23

## 2018-01-01 RX ADMIN — DIGOXIN 125 MCG: 125 TABLET ORAL at 09:10

## 2018-01-01 RX ADMIN — THERA TABS 1 TABLET: TAB at 10:18

## 2018-01-01 RX ADMIN — FLUOXETINE HYDROCHLORIDE 20 MG: 20 CAPSULE ORAL at 09:03

## 2018-01-01 RX ADMIN — INSULIN LISPRO 6 UNITS: 100 INJECTION, SOLUTION INTRAVENOUS; SUBCUTANEOUS at 08:19

## 2018-01-01 RX ADMIN — ROPINIROLE HYDROCHLORIDE 0.5 MG: 0.5 TABLET, FILM COATED ORAL at 22:03

## 2018-01-01 RX ADMIN — POTASSIUM CHLORIDE 40 MEQ: 20 TABLET, EXTENDED RELEASE ORAL at 17:43

## 2018-01-01 RX ADMIN — APIXABAN 2.5 MG: 2.5 TABLET, FILM COATED ORAL at 08:35

## 2018-01-01 RX ADMIN — Medication 500 MG: at 08:18

## 2018-01-01 RX ADMIN — FLUOXETINE HYDROCHLORIDE 20 MG: 20 CAPSULE ORAL at 08:42

## 2018-01-01 RX ADMIN — INSULIN LISPRO 2 UNITS: 100 INJECTION, SOLUTION INTRAVENOUS; SUBCUTANEOUS at 11:55

## 2018-01-01 RX ADMIN — APIXABAN 2.5 MG: 2.5 TABLET, FILM COATED ORAL at 08:42

## 2018-01-01 RX ADMIN — Medication 1 UNITS: at 21:10

## 2018-01-01 RX ADMIN — LACTULOSE 20 G: 20 SOLUTION ORAL at 15:59

## 2018-01-01 RX ADMIN — FLUOXETINE HYDROCHLORIDE 20 MG: 20 CAPSULE ORAL at 09:21

## 2018-01-01 RX ADMIN — CHOLESTYRAMINE 4 G: 4 POWDER, FOR SUSPENSION ORAL at 09:11

## 2018-01-01 RX ADMIN — INSULIN LISPRO 2 UNITS: 100 INJECTION, SOLUTION INTRAVENOUS; SUBCUTANEOUS at 12:19

## 2018-01-01 RX ADMIN — RIFAXIMIN 550 MG: 550 TABLET ORAL at 08:42

## 2018-01-01 RX ADMIN — INSULIN GLARGINE 10 UNITS: 100 INJECTION, SOLUTION SUBCUTANEOUS at 21:27

## 2018-01-01 RX ADMIN — APIXABAN 2.5 MG: 2.5 TABLET, FILM COATED ORAL at 21:06

## 2018-01-01 RX ADMIN — TRAZODONE HYDROCHLORIDE 25 MG: 50 TABLET ORAL at 20:46

## 2018-01-01 RX ADMIN — INSULIN GLARGINE 10 UNITS: 100 INJECTION, SOLUTION SUBCUTANEOUS at 06:32

## 2018-01-01 RX ADMIN — CHOLESTYRAMINE 4 G: 4 POWDER, FOR SUSPENSION ORAL at 08:17

## 2018-01-01 RX ADMIN — LEVOTHYROXINE, LIOTHYRONINE 60 MG: 38; 9 TABLET ORAL at 06:22

## 2018-01-01 RX ADMIN — APIXABAN 2.5 MG: 2.5 TABLET, FILM COATED ORAL at 10:18

## 2018-01-01 RX ADMIN — APIXABAN 2.5 MG: 2.5 TABLET, FILM COATED ORAL at 09:21

## 2018-01-01 RX ADMIN — INSULIN LISPRO 3 UNITS: 100 INJECTION, SOLUTION INTRAVENOUS; SUBCUTANEOUS at 08:32

## 2018-01-01 RX ADMIN — VITAMIN D, TAB 1000IU (100/BT) 1000 UNITS: 25 TAB at 08:09

## 2018-01-01 RX ADMIN — CHOLESTYRAMINE 4 G: 4 POWDER, FOR SUSPENSION ORAL at 10:17

## 2018-01-01 RX ADMIN — Medication 500 MG: at 09:15

## 2018-01-01 RX ADMIN — FUROSEMIDE 5 MG/HR: 10 INJECTION, SOLUTION INTRAMUSCULAR; INTRAVENOUS at 13:15

## 2018-01-01 RX ADMIN — Medication 500 MG: at 10:17

## 2018-01-01 RX ADMIN — FLUOXETINE HYDROCHLORIDE 20 MG: 20 CAPSULE ORAL at 10:05

## 2018-01-01 RX ADMIN — HYDROCORTISONE ACETATE 25 MG: 25 SUPPOSITORY RECTAL at 22:03

## 2018-01-01 RX ADMIN — LACTULOSE 20 G: 20 SOLUTION ORAL at 20:43

## 2018-01-01 RX ADMIN — PANTOPRAZOLE SODIUM 40 MG: 40 TABLET, DELAYED RELEASE ORAL at 16:14

## 2018-01-01 RX ADMIN — POTASSIUM CHLORIDE 10 MEQ: 7.46 INJECTION, SOLUTION INTRAVENOUS at 11:38

## 2018-01-01 RX ADMIN — LACTULOSE 20 G: 20 SOLUTION ORAL at 08:38

## 2018-01-01 RX ADMIN — INSULIN LISPRO 5 UNITS: 100 INJECTION, SOLUTION INTRAVENOUS; SUBCUTANEOUS at 09:33

## 2018-01-01 RX ADMIN — INSULIN LISPRO 4 UNITS: 100 INJECTION, SOLUTION INTRAVENOUS; SUBCUTANEOUS at 11:36

## 2018-01-01 RX ADMIN — LACTULOSE 20 G: 20 SOLUTION ORAL at 15:41

## 2018-01-01 RX ADMIN — ISOSORBIDE MONONITRATE 15 MG: 30 TABLET ORAL at 09:41

## 2018-01-01 RX ADMIN — Medication 1 G: at 10:29

## 2018-01-01 RX ADMIN — DOBUTAMINE HYDROCHLORIDE 2.5 MCG/KG/MIN: 200 INJECTION INTRAVENOUS at 17:03

## 2018-01-01 RX ADMIN — INSULIN LISPRO 1 UNITS: 100 INJECTION, SOLUTION INTRAVENOUS; SUBCUTANEOUS at 09:38

## 2018-01-01 RX ADMIN — POTASSIUM CHLORIDE 10 MEQ: 10 INJECTION, SOLUTION INTRAVENOUS at 09:03

## 2018-01-01 RX ADMIN — FLUOXETINE HYDROCHLORIDE 20 MG: 20 CAPSULE ORAL at 09:46

## 2018-01-01 RX ADMIN — LEVOTHYROXINE SODIUM 88 MCG: 88 TABLET ORAL at 05:47

## 2018-01-01 RX ADMIN — VITAMIN D, TAB 1000IU (100/BT) 1000 UNITS: 25 TAB at 09:15

## 2018-01-01 RX ADMIN — INSULIN GLARGINE 15 UNITS: 100 INJECTION, SOLUTION SUBCUTANEOUS at 09:14

## 2018-01-01 RX ADMIN — CARVEDILOL 12.5 MG: 6.25 TABLET, FILM COATED ORAL at 17:43

## 2018-01-01 RX ADMIN — APIXABAN 2.5 MG: 2.5 TABLET, FILM COATED ORAL at 20:44

## 2018-01-01 RX ADMIN — HYDROCORTISONE ACETATE 25 MG: 25 SUPPOSITORY RECTAL at 20:52

## 2018-01-01 RX ADMIN — ISOSORBIDE MONONITRATE 30 MG: 30 TABLET ORAL at 10:05

## 2018-01-01 RX ADMIN — LEVOTHYROXINE, LIOTHYRONINE 60 MG: 38; 9 TABLET ORAL at 06:36

## 2018-01-01 RX ADMIN — LACTULOSE 10 G: 20 SOLUTION ORAL at 08:09

## 2018-01-01 RX ADMIN — VITAMIN D, TAB 1000IU (100/BT) 1000 UNITS: 25 TAB at 09:46

## 2018-01-01 RX ADMIN — INSULIN LISPRO 6 UNITS: 100 INJECTION, SOLUTION INTRAVENOUS; SUBCUTANEOUS at 13:19

## 2018-01-01 RX ADMIN — ALBUMIN (HUMAN) 25 G: 0.25 INJECTION, SOLUTION INTRAVENOUS at 13:21

## 2018-01-01 RX ADMIN — DOBUTAMINE HYDROCHLORIDE 5 MCG/KG/MIN: 200 INJECTION INTRAVENOUS at 11:49

## 2018-01-01 RX ADMIN — LACTULOSE 10 G: 20 SOLUTION ORAL at 10:05

## 2018-01-01 RX ADMIN — CARVEDILOL 25 MG: 25 TABLET, FILM COATED ORAL at 10:05

## 2018-01-01 RX ADMIN — RIFAXIMIN 550 MG: 550 TABLET ORAL at 08:09

## 2018-01-01 RX ADMIN — LACTULOSE 20 G: 20 SOLUTION ORAL at 09:18

## 2018-01-01 RX ADMIN — GLIPIZIDE 2.5 MG: 5 TABLET ORAL at 08:34

## 2018-01-01 RX ADMIN — APIXABAN 2.5 MG: 2.5 TABLET, FILM COATED ORAL at 08:05

## 2018-01-01 RX ADMIN — CARVEDILOL 25 MG: 25 TABLET, FILM COATED ORAL at 08:17

## 2018-01-01 RX ADMIN — INSULIN LISPRO 2 UNITS: 100 INJECTION, SOLUTION INTRAVENOUS; SUBCUTANEOUS at 17:43

## 2018-01-01 RX ADMIN — FLUOXETINE HYDROCHLORIDE 20 MG: 20 CAPSULE ORAL at 08:06

## 2018-01-01 RX ADMIN — Medication 10 ML: at 21:06

## 2018-01-01 RX ADMIN — INSULIN LISPRO 4 UNITS: 100 INJECTION, SOLUTION INTRAVENOUS; SUBCUTANEOUS at 16:15

## 2018-01-01 RX ADMIN — FLUOXETINE HYDROCHLORIDE 20 MG: 20 CAPSULE ORAL at 10:18

## 2018-01-01 RX ADMIN — ROPINIROLE HYDROCHLORIDE 0.5 MG: 0.5 TABLET, FILM COATED ORAL at 21:01

## 2018-01-01 RX ADMIN — ISOSORBIDE MONONITRATE 30 MG: 30 TABLET ORAL at 09:13

## 2018-01-01 RX ADMIN — TRAZODONE HYDROCHLORIDE 25 MG: 50 TABLET ORAL at 22:04

## 2018-01-01 RX ADMIN — APIXABAN 2.5 MG: 2.5 TABLET, FILM COATED ORAL at 09:02

## 2018-01-01 RX ADMIN — INSULIN GLARGINE 10 UNITS: 100 INJECTION, SOLUTION SUBCUTANEOUS at 20:57

## 2018-01-01 RX ADMIN — INSULIN LISPRO 1 UNITS: 100 INJECTION, SOLUTION INTRAVENOUS; SUBCUTANEOUS at 17:41

## 2018-01-01 RX ADMIN — BUMETANIDE 1 MG: 1 TABLET ORAL at 10:27

## 2018-01-01 RX ADMIN — FLUOXETINE HYDROCHLORIDE 20 MG: 20 CAPSULE ORAL at 09:00

## 2018-01-01 RX ADMIN — ISOSORBIDE MONONITRATE 30 MG: 30 TABLET ORAL at 08:17

## 2018-01-01 RX ADMIN — INSULIN LISPRO 2 UNITS: 100 INJECTION, SOLUTION INTRAVENOUS; SUBCUTANEOUS at 09:15

## 2018-01-01 RX ADMIN — CHOLESTYRAMINE 4 G: 4 POWDER, FOR SUSPENSION ORAL at 09:21

## 2018-01-01 RX ADMIN — APIXABAN 2.5 MG: 2.5 TABLET, FILM COATED ORAL at 09:46

## 2018-01-01 RX ADMIN — INSULIN LISPRO 3 UNITS: 100 INJECTION, SOLUTION INTRAVENOUS; SUBCUTANEOUS at 17:29

## 2018-01-01 RX ADMIN — HYDROCORTISONE ACETATE 25 MG: 25 SUPPOSITORY RECTAL at 20:43

## 2018-01-01 RX ADMIN — ISOSORBIDE MONONITRATE 30 MG: 30 TABLET ORAL at 09:10

## 2018-01-01 RX ADMIN — PANTOPRAZOLE SODIUM 40 MG: 40 TABLET, DELAYED RELEASE ORAL at 17:04

## 2018-01-01 RX ADMIN — INSULIN HUMAN 10 UNITS: 100 INJECTION, SOLUTION PARENTERAL at 10:29

## 2018-01-01 RX ADMIN — THERA TABS 1 TABLET: TAB at 09:21

## 2018-01-01 RX ADMIN — INSULIN LISPRO 3 UNITS: 100 INJECTION, SOLUTION INTRAVENOUS; SUBCUTANEOUS at 17:20

## 2018-01-01 RX ADMIN — CARVEDILOL 25 MG: 25 TABLET, FILM COATED ORAL at 20:53

## 2018-01-01 RX ADMIN — THERA TABS 1 TABLET: TAB at 09:46

## 2018-01-01 RX ADMIN — CARVEDILOL 25 MG: 25 TABLET, FILM COATED ORAL at 09:03

## 2018-01-01 RX ADMIN — CARVEDILOL 25 MG: 25 TABLET, FILM COATED ORAL at 21:20

## 2018-01-01 RX ADMIN — LEVOTHYROXINE SODIUM 88 MCG: 88 TABLET ORAL at 05:44

## 2018-01-01 RX ADMIN — RIFAXIMIN 550 MG: 550 TABLET ORAL at 10:17

## 2018-01-01 RX ADMIN — APIXABAN 2.5 MG: 2.5 TABLET, FILM COATED ORAL at 21:01

## 2018-01-01 RX ADMIN — INSULIN LISPRO 1 UNITS: 100 INJECTION, SOLUTION INTRAVENOUS; SUBCUTANEOUS at 16:46

## 2018-01-01 RX ADMIN — INSULIN LISPRO 4 UNITS: 100 INJECTION, SOLUTION INTRAVENOUS; SUBCUTANEOUS at 08:20

## 2018-01-01 RX ADMIN — INSULIN GLARGINE 10 UNITS: 100 INJECTION, SOLUTION SUBCUTANEOUS at 20:53

## 2018-01-01 RX ADMIN — FUROSEMIDE 80 MG: 10 INJECTION, SOLUTION INTRAMUSCULAR; INTRAVENOUS at 22:07

## 2018-01-01 RX ADMIN — RIFAXIMIN 550 MG: 550 TABLET ORAL at 10:05

## 2018-01-01 RX ADMIN — HYDRALAZINE HYDROCHLORIDE 20 MG: 10 TABLET, FILM COATED ORAL at 21:43

## 2018-01-01 RX ADMIN — LACTULOSE 20 G: 20 SOLUTION ORAL at 15:13

## 2018-01-01 RX ADMIN — LACTULOSE 20 G: 20 SOLUTION ORAL at 09:11

## 2018-01-01 RX ADMIN — FUROSEMIDE 10 MG/HR: 10 INJECTION, SOLUTION INTRAMUSCULAR; INTRAVENOUS at 11:07

## 2018-01-01 RX ADMIN — CARVEDILOL 25 MG: 25 TABLET, FILM COATED ORAL at 09:41

## 2018-01-01 RX ADMIN — POTASSIUM CHLORIDE 40 MEQ: 20 TABLET, EXTENDED RELEASE ORAL at 09:46

## 2018-01-01 RX ADMIN — RIFAXIMIN 550 MG: 550 TABLET ORAL at 09:14

## 2018-01-01 RX ADMIN — Medication 500 MG: at 08:42

## 2018-01-01 RX ADMIN — Medication 10 ML: at 20:35

## 2018-01-01 RX ADMIN — INSULIN LISPRO 5 UNITS: 100 INJECTION, SOLUTION INTRAVENOUS; SUBCUTANEOUS at 12:24

## 2018-01-01 RX ADMIN — CEFTRIAXONE SODIUM 1 G: 1 INJECTION, POWDER, FOR SOLUTION INTRAMUSCULAR; INTRAVENOUS at 23:11

## 2018-01-01 RX ADMIN — LEVOTHYROXINE, LIOTHYRONINE 60 MG: 38; 9 TABLET ORAL at 06:29

## 2018-01-01 RX ADMIN — APIXABAN 2.5 MG: 2.5 TABLET, FILM COATED ORAL at 09:15

## 2018-01-01 RX ADMIN — INSULIN LISPRO 1 UNITS: 100 INJECTION, SOLUTION INTRAVENOUS; SUBCUTANEOUS at 12:16

## 2018-01-01 RX ADMIN — LEVOTHYROXINE SODIUM 88 MCG: 88 TABLET ORAL at 08:08

## 2018-01-01 RX ADMIN — BUMETANIDE 1 MG: 1 TABLET ORAL at 23:57

## 2018-01-01 RX ADMIN — LACTULOSE 10 G: 20 SOLUTION ORAL at 09:46

## 2018-01-01 RX ADMIN — ONDANSETRON 4 MG: 2 INJECTION INTRAMUSCULAR; INTRAVENOUS at 15:37

## 2018-01-01 RX ADMIN — INSULIN LISPRO 8 UNITS: 100 INJECTION, SOLUTION INTRAVENOUS; SUBCUTANEOUS at 17:05

## 2018-01-01 RX ADMIN — TRAZODONE HYDROCHLORIDE 25 MG: 50 TABLET ORAL at 20:43

## 2018-01-01 RX ADMIN — INSULIN LISPRO 1 UNITS: 100 INJECTION, SOLUTION INTRAVENOUS; SUBCUTANEOUS at 09:11

## 2018-01-01 RX ADMIN — DEXTROSE MONOHYDRATE 12.5 G: 25 INJECTION, SOLUTION INTRAVENOUS at 06:02

## 2018-01-01 RX ADMIN — INSULIN LISPRO 4 UNITS: 100 INJECTION, SOLUTION INTRAVENOUS; SUBCUTANEOUS at 09:34

## 2018-01-01 RX ADMIN — HYDROCORTISONE ACETATE 25 MG: 25 SUPPOSITORY RECTAL at 08:42

## 2018-01-01 RX ADMIN — ISOSORBIDE MONONITRATE 30 MG: 30 TABLET ORAL at 08:34

## 2018-01-01 RX ADMIN — FLUOXETINE HYDROCHLORIDE 20 MG: 20 CAPSULE ORAL at 09:30

## 2018-01-01 RX ADMIN — APIXABAN 2.5 MG: 2.5 TABLET, FILM COATED ORAL at 09:11

## 2018-01-01 RX ADMIN — INSULIN GLARGINE 10 UNITS: 100 INJECTION, SOLUTION SUBCUTANEOUS at 21:37

## 2018-01-01 RX ADMIN — INSULIN LISPRO 1 UNITS: 100 INJECTION, SOLUTION INTRAVENOUS; SUBCUTANEOUS at 17:20

## 2018-01-01 RX ADMIN — INSULIN LISPRO 2 UNITS: 100 INJECTION, SOLUTION INTRAVENOUS; SUBCUTANEOUS at 11:56

## 2018-01-01 RX ADMIN — TRAZODONE HYDROCHLORIDE 50 MG: 50 TABLET ORAL at 23:57

## 2018-01-01 RX ADMIN — INSULIN LISPRO 2 UNITS: 100 INJECTION, SOLUTION INTRAVENOUS; SUBCUTANEOUS at 17:08

## 2018-01-01 RX ADMIN — LORAZEPAM 0.5 MG: 0.5 TABLET ORAL at 00:04

## 2018-01-01 RX ADMIN — INSULIN LISPRO 2 UNITS: 100 INJECTION, SOLUTION INTRAVENOUS; SUBCUTANEOUS at 16:24

## 2018-01-01 RX ADMIN — LACTULOSE 20 G: 20 SOLUTION ORAL at 09:05

## 2018-01-01 RX ADMIN — CARVEDILOL 25 MG: 25 TABLET, FILM COATED ORAL at 16:46

## 2018-01-01 RX ADMIN — GLIPIZIDE 2.5 MG: 5 TABLET ORAL at 06:36

## 2018-01-01 RX ADMIN — HYDRALAZINE HYDROCHLORIDE 20 MG: 10 TABLET, FILM COATED ORAL at 06:27

## 2018-01-01 RX ADMIN — FLUOXETINE HYDROCHLORIDE 20 MG: 20 CAPSULE ORAL at 09:41

## 2018-01-01 RX ADMIN — CHOLESTYRAMINE 4 G: 4 POWDER, FOR SUSPENSION ORAL at 09:00

## 2018-01-01 RX ADMIN — RIFAXIMIN 550 MG: 550 TABLET ORAL at 20:44

## 2018-01-01 RX ADMIN — RIFAXIMIN 550 MG: 550 TABLET ORAL at 09:46

## 2018-01-01 RX ADMIN — HYDROCORTISONE: 1 CREAM TOPICAL at 08:10

## 2018-01-01 RX ADMIN — PANTOPRAZOLE SODIUM 40 MG: 40 TABLET, DELAYED RELEASE ORAL at 05:55

## 2018-01-01 RX ADMIN — INSULIN LISPRO 2 UNITS: 100 INJECTION, SOLUTION INTRAVENOUS; SUBCUTANEOUS at 17:03

## 2018-01-01 RX ADMIN — LACTULOSE 10 G: 20 SOLUTION ORAL at 10:18

## 2018-01-01 RX ADMIN — APIXABAN 2.5 MG: 2.5 TABLET, FILM COATED ORAL at 20:48

## 2018-01-01 RX ADMIN — LACTULOSE 20 G: 20 SOLUTION ORAL at 21:50

## 2018-01-01 RX ADMIN — VITAMIN D, TAB 1000IU (100/BT) 1000 UNITS: 25 TAB at 08:05

## 2018-01-01 RX ADMIN — APIXABAN 2.5 MG: 2.5 TABLET, FILM COATED ORAL at 20:38

## 2018-01-01 RX ADMIN — FLUOXETINE HYDROCHLORIDE 20 MG: 20 CAPSULE ORAL at 08:09

## 2018-01-01 RX ADMIN — INSULIN LISPRO 4 UNITS: 100 INJECTION, SOLUTION INTRAVENOUS; SUBCUTANEOUS at 18:19

## 2018-01-01 RX ADMIN — LACTULOSE 20 G: 20 SOLUTION ORAL at 09:02

## 2018-01-01 RX ADMIN — POTASSIUM CHLORIDE 40 MEQ: 20 TABLET, EXTENDED RELEASE ORAL at 08:17

## 2018-01-01 RX ADMIN — SODIUM BICARBONATE 50 MEQ: 84 INJECTION, SOLUTION INTRAVENOUS at 10:29

## 2018-01-01 RX ADMIN — INSULIN GLARGINE 10 UNITS: 100 INJECTION, SOLUTION SUBCUTANEOUS at 12:24

## 2018-01-01 RX ADMIN — TRAZODONE HYDROCHLORIDE 25 MG: 50 TABLET ORAL at 20:32

## 2018-01-01 RX ADMIN — POTASSIUM CHLORIDE 10 MEQ: 7.46 INJECTION, SOLUTION INTRAVENOUS at 08:10

## 2018-01-01 RX ADMIN — LACTULOSE 10 G: 20 SOLUTION ORAL at 09:19

## 2018-01-01 RX ADMIN — THERA TABS 1 TABLET: TAB at 08:17

## 2018-01-01 RX ADMIN — INSULIN LISPRO 2 UNITS: 100 INJECTION, SOLUTION INTRAVENOUS; SUBCUTANEOUS at 12:59

## 2018-01-01 RX ADMIN — BUMETANIDE 1 MG: 1 TABLET ORAL at 20:43

## 2018-01-01 RX ADMIN — Medication 1 UNITS: at 20:36

## 2018-01-01 RX ADMIN — POTASSIUM CHLORIDE 40 MEQ: 20 TABLET, EXTENDED RELEASE ORAL at 20:43

## 2018-01-01 RX ADMIN — THERA TABS 1 TABLET: TAB at 08:09

## 2018-01-01 RX ADMIN — INSULIN GLARGINE 10 UNITS: 100 INJECTION, SOLUTION SUBCUTANEOUS at 21:06

## 2018-01-01 RX ADMIN — LACTULOSE 20 G: 20 SOLUTION ORAL at 08:17

## 2018-01-01 RX ADMIN — PANTOPRAZOLE SODIUM 40 MG: 40 TABLET, DELAYED RELEASE ORAL at 16:13

## 2018-01-01 RX ADMIN — HYDRALAZINE HYDROCHLORIDE 25 MG: 25 TABLET, FILM COATED ORAL at 08:17

## 2018-01-01 RX ADMIN — RIFAXIMIN 550 MG: 550 TABLET ORAL at 21:01

## 2018-01-01 RX ADMIN — CARVEDILOL 25 MG: 25 TABLET, FILM COATED ORAL at 09:00

## 2018-01-01 RX ADMIN — VITAMIN D, TAB 1000IU (100/BT) 1000 UNITS: 25 TAB at 08:42

## 2018-01-01 RX ADMIN — LEVOTHYROXINE, LIOTHYRONINE 60 MG: 38; 9 TABLET ORAL at 05:29

## 2018-01-01 RX ADMIN — LACTULOSE 10 G: 20 SOLUTION ORAL at 08:42

## 2018-01-01 RX ADMIN — CHOLESTYRAMINE 4 G: 4 POWDER, FOR SUSPENSION ORAL at 08:06

## 2018-01-01 RX ADMIN — ISOSORBIDE MONONITRATE 30 MG: 30 TABLET ORAL at 09:46

## 2018-01-01 RX ADMIN — Medication 500 MG: at 08:06

## 2018-01-01 RX ADMIN — INSULIN LISPRO 4 UNITS: 100 INJECTION, SOLUTION INTRAVENOUS; SUBCUTANEOUS at 12:54

## 2018-01-01 RX ADMIN — Medication 1 UNITS: at 01:05

## 2018-01-01 RX ADMIN — CARVEDILOL 25 MG: 25 TABLET, FILM COATED ORAL at 17:41

## 2018-01-01 RX ADMIN — LACTULOSE 20 G: 20 SOLUTION ORAL at 09:31

## 2018-01-01 RX ADMIN — INSULIN LISPRO 3 UNITS: 100 INJECTION, SOLUTION INTRAVENOUS; SUBCUTANEOUS at 11:39

## 2018-01-01 RX ADMIN — ISOSORBIDE MONONITRATE 30 MG: 30 TABLET ORAL at 09:15

## 2018-01-01 RX ADMIN — LACTULOSE 10 G: 20 SOLUTION ORAL at 09:18

## 2018-01-01 RX ADMIN — HYDRALAZINE HYDROCHLORIDE 20 MG: 10 TABLET, FILM COATED ORAL at 15:41

## 2018-01-01 RX ADMIN — PANTOPRAZOLE SODIUM 40 MG: 40 TABLET, DELAYED RELEASE ORAL at 17:06

## 2018-01-01 RX ADMIN — TRAZODONE HYDROCHLORIDE 25 MG: 50 TABLET ORAL at 23:17

## 2018-01-01 RX ADMIN — POTASSIUM CHLORIDE 10 MEQ: 7.46 INJECTION, SOLUTION INTRAVENOUS at 13:52

## 2018-01-01 RX ADMIN — CEFTRIAXONE SODIUM 1 G: 1 INJECTION, POWDER, FOR SOLUTION INTRAMUSCULAR; INTRAVENOUS at 00:21

## 2018-01-01 RX ADMIN — HYDROCORTISONE ACETATE 25 MG: 25 SUPPOSITORY RECTAL at 09:14

## 2018-01-01 RX ADMIN — Medication 10 ML: at 20:26

## 2018-01-01 RX ADMIN — VITAMIN D, TAB 1000IU (100/BT) 1000 UNITS: 25 TAB at 10:05

## 2018-01-01 RX ADMIN — Medication 10 ML: at 21:56

## 2018-01-01 RX ADMIN — DIPHENHYDRAMINE HCL 25 MG: 25 TABLET ORAL at 21:28

## 2018-01-01 RX ADMIN — CARVEDILOL 25 MG: 25 TABLET, FILM COATED ORAL at 09:11

## 2018-01-01 RX ADMIN — POTASSIUM CHLORIDE 20 MEQ: 1500 TABLET, EXTENDED RELEASE ORAL at 16:14

## 2018-01-01 RX ADMIN — CHOLESTYRAMINE 4 G: 4 POWDER, FOR SUSPENSION ORAL at 10:05

## 2018-01-01 RX ADMIN — INSULIN LISPRO 2 UNITS: 100 INJECTION, SOLUTION INTRAVENOUS; SUBCUTANEOUS at 11:50

## 2018-01-01 RX ADMIN — ISOSORBIDE MONONITRATE 30 MG: 30 TABLET ORAL at 08:09

## 2018-01-01 RX ADMIN — ALLOPURINOL 100 MG: 100 TABLET ORAL at 09:15

## 2018-01-01 RX ADMIN — INSULIN LISPRO 6 UNITS: 100 INJECTION, SOLUTION INTRAVENOUS; SUBCUTANEOUS at 18:02

## 2018-01-01 RX ADMIN — BUMETANIDE 1 MG: 1 TABLET ORAL at 08:17

## 2018-01-01 RX ADMIN — Medication 10 ML: at 08:19

## 2018-01-01 RX ADMIN — Medication 10 ML: at 21:47

## 2018-01-01 RX ADMIN — INSULIN LISPRO 5 UNITS: 100 INJECTION, SOLUTION INTRAVENOUS; SUBCUTANEOUS at 12:19

## 2018-01-01 RX ADMIN — SODIUM POLYSTYRENE SULFONATE 15 G: 15 SUSPENSION ORAL; RECTAL at 10:29

## 2018-01-01 RX ADMIN — PHYTONADIONE 5 MG: 10 INJECTION, EMULSION INTRAMUSCULAR; INTRAVENOUS; SUBCUTANEOUS at 12:02

## 2018-01-01 RX ADMIN — ISOSORBIDE MONONITRATE 30 MG: 30 TABLET ORAL at 09:00

## 2018-01-01 RX ADMIN — POTASSIUM CHLORIDE 40 MEQ: 20 TABLET, EXTENDED RELEASE ORAL at 10:18

## 2018-01-01 RX ADMIN — APIXABAN 2.5 MG: 2.5 TABLET, FILM COATED ORAL at 20:11

## 2018-01-01 RX ADMIN — TRAZODONE HYDROCHLORIDE 25 MG: 50 TABLET ORAL at 20:44

## 2018-01-01 RX ADMIN — PANTOPRAZOLE SODIUM 40 MG: 40 INJECTION, POWDER, FOR SOLUTION INTRAVENOUS at 08:17

## 2018-01-01 RX ADMIN — LACTULOSE 20 G: 20 SOLUTION ORAL at 20:26

## 2018-01-01 RX ADMIN — APIXABAN 2.5 MG: 2.5 TABLET, FILM COATED ORAL at 09:00

## 2018-01-01 RX ADMIN — FUROSEMIDE 10 MG/HR: 10 INJECTION, SOLUTION INTRAMUSCULAR; INTRAVENOUS at 20:39

## 2018-01-01 RX ADMIN — CARVEDILOL 25 MG: 25 TABLET, FILM COATED ORAL at 08:34

## 2018-01-01 RX ADMIN — PANTOPRAZOLE SODIUM 40 MG: 40 TABLET, DELAYED RELEASE ORAL at 05:28

## 2018-01-01 RX ADMIN — Medication 1 UNITS: at 20:43

## 2018-01-01 RX ADMIN — HYDRALAZINE HYDROCHLORIDE 20 MG: 10 TABLET, FILM COATED ORAL at 21:45

## 2018-01-01 RX ADMIN — CHOLESTYRAMINE 4 G: 4 POWDER, FOR SUSPENSION ORAL at 12:53

## 2018-01-01 RX ADMIN — APIXABAN 2.5 MG: 2.5 TABLET, FILM COATED ORAL at 09:13

## 2018-01-01 RX ADMIN — APIXABAN 2.5 MG: 2.5 TABLET, FILM COATED ORAL at 21:47

## 2018-01-01 RX ADMIN — METOLAZONE 10 MG: 5 TABLET ORAL at 11:36

## 2018-01-01 RX ADMIN — PANTOPRAZOLE SODIUM 40 MG: 40 TABLET, DELAYED RELEASE ORAL at 06:00

## 2018-01-01 RX ADMIN — INSULIN LISPRO 2 UNITS: 100 INJECTION, SOLUTION INTRAVENOUS; SUBCUTANEOUS at 08:08

## 2018-01-01 RX ADMIN — Medication 10 ML: at 09:41

## 2018-01-01 RX ADMIN — RIFAXIMIN 550 MG: 550 TABLET ORAL at 09:21

## 2018-01-01 RX ADMIN — HYDROCORTISONE ACETATE 25 MG: 25 SUPPOSITORY RECTAL at 21:20

## 2018-01-01 RX ADMIN — Medication 10 ML: at 09:00

## 2018-01-01 RX ADMIN — APIXABAN 2.5 MG: 2.5 TABLET, FILM COATED ORAL at 01:05

## 2018-01-01 RX ADMIN — CHOLESTYRAMINE 4 G: 4 POWDER, FOR SUSPENSION ORAL at 10:27

## 2018-01-01 RX ADMIN — CHOLESTYRAMINE 4 G: 4 POWDER, FOR SUSPENSION ORAL at 09:30

## 2018-01-01 ASSESSMENT — ENCOUNTER SYMPTOMS
DIARRHEA: 1
EYE PAIN: 0
DIARRHEA: 0
ABDOMINAL DISTENTION: 1
ABDOMINAL PAIN: 0
NAUSEA: 0
SORE THROAT: 0
COUGH: 0
BACK PAIN: 0
BACK PAIN: 0
EYE ITCHING: 0
NAUSEA: 0
CONSTIPATION: 0
SHORTNESS OF BREATH: 0
SHORTNESS OF BREATH: 0
EYE REDNESS: 0
EYE PAIN: 0
VOMITING: 0
WHEEZING: 0
PHOTOPHOBIA: 0
DIARRHEA: 0
ABDOMINAL PAIN: 0
BACK PAIN: 0
NAUSEA: 0
EYE DISCHARGE: 0
CONSTIPATION: 0
ABDOMINAL PAIN: 0
BLOOD IN STOOL: 0
COLOR CHANGE: 0
NAUSEA: 0
RHINORRHEA: 0
SHORTNESS OF BREATH: 0
ABDOMINAL PAIN: 0
WHEEZING: 0
SORE THROAT: 0
VOICE CHANGE: 0
EYE DISCHARGE: 0
DIARRHEA: 0
CONSTIPATION: 0
COUGH: 0
SHORTNESS OF BREATH: 0
EYE PAIN: 0
RHINORRHEA: 0
RHINORRHEA: 0
EYE DISCHARGE: 0
SHORTNESS OF BREATH: 0
EYE PAIN: 0
ABDOMINAL PAIN: 0
ABDOMINAL DISTENTION: 0
VOMITING: 0
CHEST TIGHTNESS: 0
BLOOD IN STOOL: 0
SHORTNESS OF BREATH: 0
SORE THROAT: 0
COUGH: 0
RHINORRHEA: 0
COUGH: 0
ABDOMINAL DISTENTION: 0
DIARRHEA: 0
ABDOMINAL DISTENTION: 1
SINUS PRESSURE: 0
NAUSEA: 0
SHORTNESS OF BREATH: 0
WHEEZING: 0
BACK PAIN: 0
COUGH: 0
EYE PAIN: 0
WHEEZING: 0
VOMITING: 0
CHOKING: 0
BLOOD IN STOOL: 0
SORE THROAT: 0
ABDOMINAL PAIN: 0
EYE DISCHARGE: 0
COUGH: 0
VOMITING: 0
TROUBLE SWALLOWING: 0
BACK PAIN: 0
RHINORRHEA: 0
SORE THROAT: 0
DIARRHEA: 0
BACK PAIN: 0
RHINORRHEA: 0
CHEST TIGHTNESS: 0
EYE DISCHARGE: 0
DIARRHEA: 0
PHOTOPHOBIA: 0
ABDOMINAL PAIN: 1
WHEEZING: 0
BACK PAIN: 0
COUGH: 0
EYE PAIN: 0
WHEEZING: 0
VOMITING: 0
VOMITING: 0
SINUS PRESSURE: 0
VOMITING: 0

## 2018-01-01 ASSESSMENT — PAIN SCALES - GENERAL
PAINLEVEL_OUTOF10: 0
PAINLEVEL_OUTOF10: 0
PAINLEVEL_OUTOF10: 7
PAINLEVEL_OUTOF10: 0
PAINLEVEL_OUTOF10: 8
PAINLEVEL_OUTOF10: 2
PAINLEVEL_OUTOF10: 9
PAINLEVEL_OUTOF10: 0
PAINLEVEL_OUTOF10: 1
PAINLEVEL_OUTOF10: 0

## 2018-01-01 ASSESSMENT — PAIN DESCRIPTION - PROGRESSION

## 2018-01-01 ASSESSMENT — PAIN DESCRIPTION - ORIENTATION
ORIENTATION: LEFT
ORIENTATION: RIGHT;LEFT
ORIENTATION: RIGHT;LEFT

## 2018-01-01 ASSESSMENT — PAIN DESCRIPTION - DESCRIPTORS
DESCRIPTORS: ACHING
DESCRIPTORS: ACHING
DESCRIPTORS: CRAMPING
DESCRIPTORS: SHARP

## 2018-01-01 ASSESSMENT — PAIN DESCRIPTION - PAIN TYPE
TYPE: ACUTE PAIN

## 2018-01-01 ASSESSMENT — PAIN DESCRIPTION - LOCATION
LOCATION: LEG
LOCATION: ARM;SHOULDER
LOCATION: ABDOMEN
LOCATION: LEG

## 2018-01-01 ASSESSMENT — PATIENT HEALTH QUESTIONNAIRE - PHQ9
SUM OF ALL RESPONSES TO PHQ QUESTIONS 1-9: 0
2. FEELING DOWN, DEPRESSED OR HOPELESS: 0
1. LITTLE INTEREST OR PLEASURE IN DOING THINGS: 0
SUM OF ALL RESPONSES TO PHQ9 QUESTIONS 1 & 2: 0

## 2018-01-01 ASSESSMENT — PAIN DESCRIPTION - FREQUENCY
FREQUENCY: CONTINUOUS
FREQUENCY: CONTINUOUS

## 2018-01-01 ASSESSMENT — PAIN DESCRIPTION - ONSET: ONSET: GRADUAL

## 2018-05-23 PROBLEM — K74.69 OTHER CIRRHOSIS OF LIVER (HCC): Status: ACTIVE | Noted: 2018-01-01

## 2018-05-23 PROBLEM — K74.60 CIRRHOSIS OF LIVER WITH ASCITES (HCC): Status: ACTIVE | Noted: 2018-01-01

## 2018-05-23 PROBLEM — R18.8 OTHER ASCITES: Status: ACTIVE | Noted: 2018-01-01

## 2018-05-23 PROBLEM — R18.8 CIRRHOSIS OF LIVER WITH ASCITES (HCC): Status: ACTIVE | Noted: 2018-01-01

## 2018-05-23 PROBLEM — R33.9 URINARY RETENTION: Status: ACTIVE | Noted: 2018-01-01

## 2018-05-25 PROBLEM — I51.9 LEFT VENTRICULAR SYSTOLIC DYSFUNCTION: Status: ACTIVE | Noted: 2018-01-01

## 2018-05-25 PROBLEM — K76.82 ACUTE HEPATIC ENCEPHALOPATHY: Status: ACTIVE | Noted: 2018-01-01

## 2018-06-11 PROBLEM — E87.5 HYPERKALEMIA: Status: ACTIVE | Noted: 2018-01-01

## 2018-06-28 NOTE — ED PROVIDER NOTES
[facility]  eMERGENCY dEPARTMENT eNCOUnter          CHIEF COMPLAINT       Chief Complaint   Patient presents with    Abdominal Pain    Bloated    Diarrhea       Nurses Notes reviewed and I agree except as noted in the HPI. HISTORY OF PRESENT ILLNESS    Elle Aguilera is a 76 y.o. female who presents to the Emergency Department for the evaluation of diarrhea multiple times a day. The patient states that 2 weeks ago she was evaluated in the department for the similar symptoms and reports that her potassium level was 6. She states that she feels the same now as she did then after having several days of diarrhea with several episodes a day. The patient has a prescription for lactulose but stopped taking it because it was making her symptoms worse. The patient reports that she does not have O2 at home. The patient admits generalized malaise, fatigue and a feeling of being off balanced. She denies any abdominal pain or any other signs or symptoms at this time. The patient denies being on antibiotics recently. The patient denies any smoking of cigarettes, or alcohol consumption. The patient has been eating normally. The patient has a history of liver disease, diabetes mellitus, CAD, CABG x3, and a leg infection. The patient denies any history of abdominal surgeries. The HPI was provided by the patient. REVIEW OF SYSTEMS     Review of Systems   Constitutional: Positive for fatigue. Negative for appetite change, chills and fever. Generalized malaise   HENT: Negative for congestion, ear pain, rhinorrhea and sore throat. Eyes: Negative for pain, discharge and visual disturbance. Respiratory: Negative for cough, shortness of breath and wheezing. Cardiovascular: Negative for chest pain, palpitations and leg swelling. Gastrointestinal: Negative for abdominal pain, diarrhea, nausea and vomiting.    Genitourinary: Negative for difficulty urinating, dysuria, hematuria and Ultrasound and MRI are read by the radiologist.    XR CHEST PORTABLE   Final Result   Prominence of the pulmonary vessels suggesting mild pulmonary vascular congestion. Correlation advised   Cardiomegaly. **This report has been created using voice recognition software. It may contain minor errors which are inherent in voice recognition technology. **      Final report electronically signed by Dr. Ej Fallon on 6/28/2018 5:21 PM          LABS:   Labs Reviewed   CBC WITH AUTO DIFFERENTIAL - Abnormal; Notable for the following:        Result Value    RBC 3.51 (*)     Hemoglobin 10.1 (*)     Hematocrit 32.1 (*)     MCHC 31.5 (*)     RDW-CV 21.5 (*)     RDW-SD 69.7 (*)     All other components within normal limits   BASIC METABOLIC PANEL - Abnormal; Notable for the following:     CO2 20 (*)     Glucose 122 (*)     BUN 36 (*)     All other components within normal limits   HEPATIC FUNCTION PANEL - Abnormal; Notable for the following:     Alb 2.9 (*)     Bilirubin, Direct 0.5 (*)     Alkaline Phosphatase 154 (*)     ALT 10 (*)     Total Protein 6.0 (*)     All other components within normal limits   PROTIME-INR - Abnormal; Notable for the following:     INR 1.29 (*)     All other components within normal limits   GLOMERULAR FILTRATION RATE, ESTIMATED - Abnormal; Notable for the following:     Est, Glom Filt Rate 48 (*)     All other components within normal limits   URINE WITH REFLEXED MICRO - Abnormal; Notable for the following:     Protein, UA TRACE (*)     Leukocyte Esterase, Urine LARGE (*)     Color, UA DK YELLOW (*)     Character, Urine CLOUDY (*)     All other components within normal limits   BRAIN NATRIURETIC PEPTIDE - Abnormal; Notable for the following:     Pro-BNP 5680.0 (*)     All other components within normal limits   URINE CULTURE, REFLEXED    Narrative:     Source: cath urine       Site: catheter          Current Antibiotics: none   GASTROINTESTINAL PANEL BY DNA   ANION GAP   OSMOLALITY   SCAN OF Discharge    PATIENT REFERRED TO:  Wilma Mcgrath, APRN - CNP  [address]  [phone]    Schedule an appointment as soon as possible for a visit in 2 days        DISCHARGE MEDICATIONS:  Discharge Medication List as of 6/28/2018  5:27 PM          (Please note that portions of this note were completed with a voice recognition program.  Efforts were made to edit the dictations but occasionally words are mis-transcribed.)    Scribe:  Diana Pierre 6/28/18 2:13 PM Scribing for and in the presence of Philipp Gray MD.    Signed by: Michelle Davidson, 06/29/18 9:16 AM    Provider:  I personally performed the services described in the documentation, reviewed and edited the documentation which was dictated to the scribe in my presence, and it accurately records my words and actions.     Philipp Gray MD 6/28/18 9:16 AM          Philipp Gray MD  06/29/18 8422

## 2018-06-28 NOTE — ED NOTES
Patient in ed room 8 from triage. Patient complain of abdominal distension and diarrhea. Patient in gown on monitor, vital signs obtained and assessment completed.      Brian Jacques RN  06/28/18 2665

## 2018-07-03 NOTE — BRIEF OP NOTE
Post Procedure Progress Note    7/3/2018  Pre-Procedure Diagnosis: Ascites  Post-Procedure Diagnosis: Same  Physician: Tristin Keita MD  Anesthesia: 2% lidocaine local  Procedure Performed: Ultrasound guided paracentesis  Specimen Removed: 1.4 liters clear yellow ascites  Disposition of Specimen: 80 ml specimen sent to the lab  Estimated Blood Loss: None  Complications: None

## 2018-07-05 NOTE — ED NOTES
Pt up to bedside commode with assistance. Pt dyspneic with exertion. Pt returned to bed and repositioned for comfort, denies further needs at this time.       Justine Branham RN  07/05/18 0911

## 2018-07-05 NOTE — ED PROVIDER NOTES
[facility]  eMERGENCY dEPARTMENT eNCOUnter          CHIEF COMPLAINT       Chief Complaint   Patient presents with    Other     swelling all over       Nurses Notes reviewed and I agree except as noted in the HPI. HISTORY OF PRESENT ILLNESS    India Stevens is a 76 y.o. female who presents to the Emergency Department for the evaluation of bilateral leg swelling onset 1 week ago. She states she has gained 50 pounds in the past 2 weeks. She reports orthopnea, chills and chest pressure. She denies frequency, fever, abdominal pain, dysuria or chest pain. The patient was in the ED on 06/28/18 for diarrhea. Per daughter, the patient is currently on [address]. the patient had paracentesis 2 days ago and got 1.5 L drained. She states she was supposed to have an appointment with her nephrologist Dr. Marce Snider 2 days ago, but missed the appointment due to being in the hospital. She states she consumes low sodium foods. The patient has a history of liver disease, diabetes mellitus, CAD and CABG x3. The HPI was provided by the patient. REVIEW OF SYSTEMS     Review of Systems   Constitutional: Positive for chills and unexpected weight change (gain). Negative for appetite change, fatigue and fever. HENT: Negative for congestion, ear pain, rhinorrhea and sore throat. Eyes: Negative for pain, discharge and visual disturbance. Respiratory: Negative for cough, shortness of breath and wheezing. Reports orthopnea. Cardiovascular: Positive for chest pain (pressure) and leg swelling (bilateral). Negative for palpitations. Gastrointestinal: Negative for abdominal pain, diarrhea, nausea and vomiting. Genitourinary: Negative for difficulty urinating, dysuria, frequency, hematuria and vaginal discharge. Musculoskeletal: Negative for arthralgias, back pain, joint swelling and neck pain. Skin: Negative for pallor and rash.    Neurological: Negative for dizziness, syncope, weakness, daily    LACTULOSE (CHRONULAC) 10 GM/15ML SOLUTION    Take 10 g by mouth daily    MULTIPLE VITAMIN (TAB-A-SADA PO)    Take 1 tablet by mouth daily    NAPROXEN SODIUM (ALEVE) 220 MG CAPS    Take 220 mg by mouth daily as needed for Pain    NITROFURANTOIN, MACROCRYSTAL-MONOHYDRATE, (MACROBID) 100 MG CAPSULE    Take 1 capsule by mouth 2 times daily for 5 days    THYROID (ARMOUR) 60 MG TABLET    Take 60 mg by mouth daily    TRAZODONE (DESYREL) 50 MG TABLET    Take 0.5 tablets by mouth nightly as needed for Sleep    VITAMIN C (ASCORBIC ACID) 500 MG TABLET    Take 500 mg by mouth daily    VITAMIN D (CHOLECALCIFEROL) 1000 UNIT TABS TABLET    Take 1,000 Units by mouth daily       ALLERGIES     is allergic to codeine; other; and statins. FAMILY HISTORY     indicated that the status of her mother is unknown. She indicated that the status of her father is unknown. She indicated that the status of her maternal aunt is unknown. She indicated that the status of her neg hx is unknown.    family history includes Cancer in her father; Diabetes in her maternal aunt; Stroke in her mother. SOCIAL HISTORY      reports that she has never smoked. She has never used smokeless tobacco. She reports that she does not drink alcohol or use drugs. PHYSICAL EXAM     INITIAL VITALS:  height is 5' 5\" (1.651 m) and weight is 227 lb (103 kg). Her blood pressure is 128/61 and her pulse is 63. Her respiration is 18 and oxygen saturation is 100%. Physical Exam   Constitutional: She is oriented to person, place, and time. She appears well-developed and well-nourished. Non-toxic appearance. HENT:   Head: Normocephalic and atraumatic. Right Ear: Tympanic membrane and external ear normal.   Left Ear: Tympanic membrane and external ear normal.   Nose: Nose normal.   Mouth/Throat: Oropharynx is clear and moist and mucous membranes are normal. No oropharyngeal exudate, posterior oropharyngeal edema or posterior oropharyngeal erythema.    Eyes: using voice recognition software. It may contain minor errors which are inherent in voice recognition technology. **      Final report electronically signed by Dr. Alvin Johnson on 7/5/2018 5:58 PM          LABS:   Labs Reviewed   CBC WITH AUTO DIFFERENTIAL - Abnormal; Notable for the following:        Result Value    RBC 3.37 (*)     Hemoglobin 9.9 (*)     Hematocrit 32.6 (*)     MCHC 30.4 (*)     RDW-CV 21.7 (*)     RDW-SD 75.2 (*)     All other components within normal limits   BRAIN NATRIURETIC PEPTIDE - Abnormal; Notable for the following:     Pro-BNP 6923.0 (*)     All other components within normal limits   PROTIME-INR - Abnormal; Notable for the following:     INR 1.26 (*)     All other components within normal limits   HEPATIC FUNCTION PANEL - Abnormal; Notable for the following:     Alb 2.7 (*)     Alkaline Phosphatase 153 (*)     ALT 10 (*)     Total Protein 5.9 (*)     All other components within normal limits   BASIC METABOLIC PANEL - Abnormal; Notable for the following:     Potassium 5.4 (*)     CO2 16 (*)     Glucose 182 (*)     BUN 45 (*)     CREATININE 1.3 (*)     Calcium 8.4 (*)     All other components within normal limits   GLOMERULAR FILTRATION RATE, ESTIMATED - Abnormal; Notable for the following:     Est, Glom Filt Rate 39 (*)     All other components within normal limits   URINE WITH REFLEXED MICRO - Abnormal; Notable for the following:     Protein, UA TRACE (*)     Leukocyte Esterase, Urine LARGE (*)     Character, Urine CLOUDY (*)     All other components within normal limits   CULTURE BLOOD #2   CULTURE BLOOD #1   URINE CULTURE, REFLEXED    Narrative:     Source: urine, clean catch       Site:           Current Antibiotics: not stated   LACTIC ACID, PLASMA   LIPASE   TROPONIN   OSMOLALITY   ANION GAP   SCAN OF BLOOD SMEAR   CBC   BASIC METABOLIC PANEL   BASIC METABOLIC PANEL   PHOSPHORUS   CK       EMERGENCY DEPARTMENT COURSE:   Vitals:    Vitals:    07/05/18 1640   BP: (!) 128/59   Pulse: 61

## 2018-07-05 NOTE — TELEPHONE ENCOUNTER
Daughter called and states pt is starting to get more swelling. Pt had thoracentesis done which they could only remove 1 1/2 liters. Pt's weight today was 227. Pt normally weighs 168. What do you want to do?

## 2018-07-06 NOTE — PROGRESS NOTES
Pharmacy Medication History Note      List of current medications patient is taking is complete. Source of information:      Changes made to medication list:  Medications removed (include reason, ex. therapy complete or physician discontinued):  · None     Medications added/doses adjusted:  · None    Other notes (ex.  Recent course of antibiotics, Coumadin dosing):  · Denies use of other OTC or herbal medications: Pt. Start taking vitamin A       Allergies reviewed      Electronically signed by Belkys Cuadra on 7/5/2018 at 9:11 PM

## 2018-07-06 NOTE — ED NOTES
Called [facility] and spoke with Pati Sierra. Pt to be transported to [facility] in stable condition. Pt transported delayed d/t floor wanting a little more time.      Kailey Negro  07/05/18 6395

## 2018-07-06 NOTE — PLAN OF CARE
Problem: Falls - Risk of:  Goal: Will remain free from falls  Will remain free from falls   Outcome: Ongoing  Patient up with standby assist and walker. Alarms on. Using call light appropriately. Problem: Risk for Impaired Skin Integrity  Goal: Tissue integrity - skin and mucous membranes  Structural intactness and normal physiological function of skin and  mucous membranes. Outcome: Ongoing  Blanchable redness on buttocks. Scattered abrasions. Turning patient every 2 hours while in bed. Problem: Pain:  Goal: Pain level will decrease  Pain level will decrease   Outcome: Ongoing  Patient denying any pain this shift. Problem: Discharge Planning:  Goal: Discharged to appropriate level of care  Discharged to appropriate level of care   Outcome: Ongoing  Plans to discharge home    Problem: Nutrition  Goal: Optimal nutrition therapy  Outcome: Ongoing  Eating most of meals. Reporting good appetite. Comments: Care plan reviewed with patient. Patient verbalize understanding of the plan of care and contribute to goal setting.

## 2018-07-06 NOTE — CONSULTS
[facility] 7-6-18     [facility]  [id number]  1940  female      Requesting provider: Dr Carla Saenz  Indications CC: Cirrhosis   Dictating for Dr Anne Marie Acuña     HPI: This is a 76year old female seen as a new consult for cirrhosis. She was admitted 7-5-18 with anasarca. She reported bilateral leg swelling and 50 pound weight gain in 2 weeks. She was also having orthopnea, chills and chest pressure. She is taking antibiotic for UTI. Her last paracentesis was 7-3-18 with removal of 1.4L. She was initially seen as a hospital consult on 5-23-18. seen as a new consult for cirrhosis. Admitted 5-22-18 with altered mental status, acute kidney injury. CT scan head 5-22-18 no acute findings. Chest x ray with small right pleural effusion. Cardiomegaly. CT scan abdomen and pelvis 5-22-18 with Cardiomegaly, small right pleural effusion. 2. Cirrhotic liver with moderate amount of abdominal and pelvic ascites. 3. Advanced vascular calcifications consistent with atherosclerosis. 4. Small gallstone. Correlation with serology is advised. 5. Scattered stool in the colon with diverticulosis. No diverticulitis at this time. 6. Probable uterine fibroids.      She has A fib, AICD and history of CABG and take Eliquis at home. Ammonia is 92, hemoglobin 11.4 without overt GI bleeding. She was started on Lactulose tid. Confusion has improved. She lives in [facility], her daughter brought her to [facility] after she called and was confused. She denied any previous liver disease and has never had EGD or colonoscopy. She states she was not feeling well for about a week prior to admission. She thought she had a virus and was having constipation then took an OTC laxative which caused her to have severe diarrhea with fecal incontinence. She denied any hematochezia or melena. Ultrasound liver with dopplers 5-24-18 Cirrhosis of the liver. 2.  Spectral Doppler imaging shows evidence of portal hypertension.   3.  Color flow Doppler does show normal direction of blood flow in the portal vein and in the hepatic veins. Flow is demonstrated also in the hepatic artery. 4.  Cholelithiasis with a contracted gallbladder. There is moderate gallbladder wall edema. The edema can be caused by hypoproteinemia and by the surrounding moderate amount of ascites. Correlate clinically and chemically for acute cholecystitis. Hepatopathy labs were negative. Paracentesis negative for SBP. Serum quant gold was negative. No diuretics were added due to elevated creatine. She was last seen in office with this writer on 6-13-18 for follow up cirrhosis. She was in office with her daughter who is her caregiver along with home health. She had a couple ER visits after hospitalization for ascites. She also had an ER visit for hyperkalemia after starting aldactone 100mg daily. She has taken lasix 20mg daily for a long time due to her cardiac history. She was taking Lactulose although only 15ml once daily. Her daughter states she and her mother have decided for no further workup from a GYN standpoint for thickened endometrium. Her daughter stated her mother's code status is palliative comfort care although is not on hospice. Her daughter asked about Plurex cath for ascites. We discussed at length cirrhosis and ascites. We do not recommended Plurex cath placement for management of ascites unless the patient is going to hospice due to infection risk. No diuretics were added due to her renal injury from hospitalization and she had hyperkalemia after starting Aldactone prescribed per ER. She was scheduled for weekly paracentesis with limit of 4 L. Lactulose 30 daily.          Patient Active Problem List   Diagnosis    Anasarca    Acquired hypothyroidism    Cardiomyopathy ([facility]    Essential hypertension    Diabetes mellitus type 2 in obese ([facility]    Anemia, normocytic normochromic    Acute on chronic systolic congestive heart failure (HCC)    PAD (peripheral units, 400 and above Take 6 units.  6/11/18  Yes LEMUEL Figueroa - CNP   Naproxen Sodium (ALEVE) 220 MG CAPS Take 220 mg by mouth daily as needed for Pain   Yes Historical Provider, MD   furosemide (LASIX) 20 MG tablet Take 1 tablet by mouth daily 5/31/18  Yes Shyanne Huerta MD   apixaban (ELIQUIS) 2.5 MG TABS tablet Take 1 tablet by mouth 2 times daily 5/30/18  Yes Shyanne Huerta MD   isosorbide mononitrate (IMDUR) 30 MG extended release tablet Take 1 tablet by mouth daily 5/31/18  Yes Shyanne Huerta MD   traZODone (DESYREL) 50 MG tablet Take 0.5 tablets by mouth nightly as needed for Sleep 5/30/18  Yes Shyanne Huerta MD   cholestyramine light 4 g packet Take 1 packet by mouth daily 5/31/18  Yes Shyanne Huerta MD   hydrALAZINE (APRESOLINE) 10 MG tablet Take 2 tablets by mouth every 8 hours 5/30/18  Yes Shyanne Huerta MD   digoxin (LANOXIN) 125 MCG tablet Take 1 tablet by mouth three times a week  Patient taking differently: Take 125 mcg by mouth three times a week Monday, Wednesday, Friday 6/1/18  Yes Shyanne Huerta MD   Multiple Vitamin (TAB-A-SADA PO) Take 1 tablet by mouth daily   Yes Historical Provider, MD   vitamin D (CHOLECALCIFEROL) 1000 UNIT TABS tablet Take 1,000 Units by mouth daily   Yes Historical Provider, MD   vitamin C (ASCORBIC ACID) 500 MG tablet Take 500 mg by mouth daily   Yes Historical Provider, MD   FISH OIL-KRILL OIL PO Take 1 capsule by mouth daily   Yes Historical Provider, MD   B Complex-C (SUPER B COMPLEX PO) Take 1 tablet by mouth daily   Yes Historical Provider, MD   carvedilol (COREG) 25 MG tablet Take 1 tablet by mouth 2 times daily (with meals) 12/7/16  Yes Josseline Salazar PA-C   thyroid (ARMOUR) 60 MG tablet Take 60 mg by mouth daily   Yes Historical Provider, MD   FLUoxetine (PROZAC) 20 MG capsule Take 20 mg by mouth daily   Yes Historical Provider, MD   glipiZIDE (GLUCOTROL) 5 MG tablet Take 10 mg by mouth 2 times daily (before meals)    Yes Historical Provider, MD   lactulose (CHRONULAC) 10 GM/15ML solution Take 10 g by mouth daily    Historical Provider, MD     Current Facility-Administered Medications   Medication Dose Route Frequency Provider Last Rate Last Dose    DOBUTamine (DOBUTREX) 500 mg in dextrose 5 % 250 mL infusion  2.5 mcg/kg/min Intravenous Continuous Jessie Granda MD 7.7 mL/hr at 07/06/18 0928 2.5 mcg/kg/min at 07/06/18 0928    furosemide (LASIX) 500 mg in dextrose 5 % 100 mL infusion  10 mg/hr Intravenous Continuous Jessie Granda MD 2 mL/hr at 07/06/18 1107 10 mg/hr at 07/06/18 1107    insulin lispro (HUMALOG) injection vial 0-12 Units  0-12 Units Subcutaneous TID WC Eduardo Bond MD   2 Units at 07/06/18 0920    insulin lispro (HUMALOG) injection vial 0-6 Units  0-6 Units Subcutaneous Nightly Eduardo Bond MD   1 Units at 07/06/18 0105    lactulose (CHRONULAC) 10 GM/15ML solution 10 g  10 g Oral Daily Eduardo Bond MD   10 g at 07/06/18 0919    apixaban (ELIQUIS) tablet 2.5 mg  2.5 mg Oral BID Eduardo Bond MD   2.5 mg at 07/06/18 2149    isosorbide mononitrate (IMDUR) extended release tablet 30 mg  30 mg Oral Daily Eduardo Bond MD   30 mg at 07/06/18 8606    carvedilol (COREG) tablet 25 mg  25 mg Oral BID Eduardo Bond MD   25 mg at 07/06/18 3981    pantoprazole (PROTONIX) injection 40 mg  40 mg Intravenous Daily Eduardo Bond MD   40 mg at 07/06/18 8307     Allergies   Allergen Reactions    Codeine     Other      Reductase inhibitors    Statins        Social History     Social History    Marital status:       Spouse name: N/A    Number of children: N/A    Years of education: N/A     Social History Main Topics    Smoking status: Never Smoker    Smokeless tobacco: Never Used    Alcohol use No    Drug use: No    Sexual activity: No     Other Topics Concern    None     Social History Narrative    None     Family History   Problem Relation Age of Onset    Stroke Mother     Cancer Father     Diabetes Maternal Aunt

## 2018-07-06 NOTE — H&P
[address] [place] [address]                               HISTORY AND PHYSICAL    PATIENT NAME: Rosalee Marroquin              :        1940  MED REC NO:   [id number]                           ROOM:       0012  ACCOUNT NO:   [de-identified]                           ADMIT DATE: 2018  PROVIDER:     Telly Waterman MD    CHIEF COMPLAINT:  \"I gained 50 pounds over the last three weeks. \"    HISTORY OF PRESENT ILLNESS:  The patient is a 68-year-old patient with past  medical history of cirrhosis and end-stage congestive heart failure with an  ejection fraction only 15%, diabetes, and atrial fibrillation, on apixaban,  who comes to the hospital with a three-week history of gradually worsening  fluid accumulation. The patient reported that her baseline weight is 170  and she weighed herself today with a result of 220. The patient had recent  admission over the last week when the patient was diagnosed with UTI and  she is still due for two days of antibiotic therapy for the UTI. She  currently feels weak and lethargic. She wears 3 L nasal cannula 24 x 7 for  her CHF history. Her code status is DNR-CCA, _____ intubation and central  lines. Aside from that, she denies any confusion, nausea, or vomiting. Denies any chest pain or shortness of breath. She endorses swelling of her  arms and legs dramatically. Denies any bright red blood per rectum or  melena. REVIEW OF SYSTEMS:  Again, no headaches. No blurry vision. No dysphagia  or odynophagia. No chest pain or shortness of breath. No abdominal pain. No nausea or vomiting. No diarrhea. No melena or bright red blood per  rectum. No dysuria. There is positive unintentional weight gain. The  rest of the review of systems is negative with the exception what is  described in the HPI.     PAST MEDICAL HISTORY:  Hypothyroidism, hypertension, diabetes, cirrhosis,  CHF, end-stage systolic heart failure, cerebral artery occlusion with  cerebral infarction, and atrial fibrillation. PAST SURGICAL HISTORY:  Vascular surgery, pacemaker insertion, and cardiac  surgery. FAMILY HISTORY:  Mother with stroke. Father with cancer. SOCIAL HISTORY:  The patient was never a smoker, never a drinker, and never  an illicit drug user. CODE STATUS:  DNR-CCA.  DNI. MEDICATIONS:  The patient also has medication reconciliation as follows: Macrobid, insulin, lactulose, furosemide, Eliquis, Imdur, trazodone,  hydralazine, digoxin, Coreg, levothyroxine, fluoxetine, and glipizide. PHYSICAL EXAMINATION:  VITAL SIGNS:  Temperature 98.3, blood pressure 128/61, heart rate 63,  respiratory rate 18, and saturation 100% on 2 L nasal cannula. GENERAL:  The patient is lying on the bed in mild distress because of  \"diffuse fluid accumulation. \"  HEENT:  PERRLA and EOMI. NECK:  There is positive JVD. RESPIRATORY:  Crackles on bases. No expiratory wheezes. ABDOMEN:  Soft, nontender, and nondistended. Positive bowel sounds. CARDIOVASCULAR:  Irregular rhythm. Regular rate. No murmur, gallops, or  rubs. :  Negative suprapubic or CVA tenderness. EXTREMITIES:  Lower extremity with edema +3 below the knees. Edema in the  left upper extremity. MUSCULOSKELETAL:  Range of motion preserved in all extremities. SKIN:  No hematoma, petechiae, or ecchymosis. PSYCHIATRIC:  Proper affect. LABORATORY DATA:  The patient has the following labs:  Sodium of 136,  potassium 5.4, chloride 109, CO2 16, BUN 45, creatinine is 1.3, anion gap  11, GFR of 39, lactic acid 1.2, glucose 182, and calcium 8.4. Total  protein is 5.9. BNP is 6823. Less than 0.01 troponin. Albumin 2.7. ALT  and AST 10 and 16. Alkaline phosphatase 153.  _____ is 0.6. WBC 7.9. Hemoglobin 9.9, MCV 96, and platelet count 814. INR 1.26.   Urine obtained  is positive for pyuria, leukocyturia, positive leukocyte esterase, and  nitrite negative. EKG showing the following:  Normal sinus rhythm. Right bundle-branch  block. Left anterior fascicular block. Q waves in V1, V2, V3, and aVL. ASSESSMENT AND PLAN:  The patient is a 43-year-old patient with history of  end-stage systolic congestive heart failure, EF 15%, on diuretics; atrial  fibrillation, on Eliquis; and cirrhosis who comes to the hospital with  anasarca. PROBLEMS:  1. Anasarca secondary to advanced cirrhosis with coagulopathy. The  patient has been compliant with her water pills at home, but despite that,  the patient has been gaining weight. The patient takes 20 mg tablets of  Lasix. We are going to increase it to 20 mg twice a day. I am going to  hold off her spironolactone since the patient has hyperkalemia. We will  give a dose of Zaroxolyn 2.5 mg x1 today. We are going to put the patient  on Ya catheter and measure strict I's and O's, measure daily weights,  and restrict the fluid intake to less than 1 L a day. We will start the  patient on rifaximin antibiotic. We will consult GI. Given the fact that  the patient had a recent tap, would consider this patient for a pigtail  indwelling catheter for ambulatory drainage of peritoneal fluid - \"PleurX. \"  We will let GI and the patient decide that. 2.  Chronic kidney disease stage III:  The patient's BUN is 45 and  creatinine 1.3, is apparently at baseline with no major changes. That is  encouraging with the prospect of further diuresis. 3.  Moderate protein-calorie malnutrition secondary to cirrhosis: We will  give the patient Ensure three times a day. 4.  Chronic normocytic anemia secondary to chronic kidney disease stage  III:  Iron studies could be done as an outpatient. No obvious signs of GI  bleed at this time. 5.  Coagulopathy secondary to advanced cirrhosis:   We will monitor her INR  depending upon the patient's hemodynamic status, but at this time, we will  not give vitamin K.  6.  For her urinary tract infection, we will stop Macrobid as the patient  has chronic kidney disease stage III. We will wait for the results of the  urine culture before starting the patient on new antibiotic therapy. The  patient was needing additional two days of antibiotic therapy. 7.  Right pleural effusion secondary to anasarca. 8.  History of atrial fibrillation with anticoagulation, right now in sinus  rhythm. We will continue with the patient's home medications including  Eliquis, digoxin, and Coreg. 9.  End-stage systolic congestive heart failure, ejection fraction 15%. Continue with carvedilol, furosemide, Imdur, and digoxin. 10.  Hypertension:  Continue hydralazine every 8 hours. 11.  Hypothyroidism:  Continue levothyroxine 60 as the patient takes at  home. 12.  Depression and anxiety:  Continue fluoxetine. 13.  For diabetes mellitus type 2, a.c./h.s. insulin sliding scale will be  ordered and diabetic diet of course. 14.  For insomnia, continue with trazodone. 15.  For GI prophylaxis, Protonix. 16.  For DVT prophylaxis, the patient is already anticoagulated with  Eliquis.         Isis Mariano MD    D: 07/05/2018 20:56:19       T: 07/05/2018 23:00:03     [id number]  Job#: [id number]     Doc#: [id number]    CC:

## 2018-07-06 NOTE — PLAN OF CARE
Problem: Nutrition  Goal: Optimal nutrition therapy  Outcome: Ongoing  Nutrition Problem: Inadequate oral intake  Intervention: Food and/or Nutrient Delivery: Continue current diet, Mineral Supplement, Vitamin Supplement, Start ONS  Nutritional Goals: Pt will adhere to diet orders and be able to eat at least 75% of her meals

## 2018-07-06 NOTE — PROGRESS NOTES
edema mor in left arm      Medications:  Reviewed    Infusion Medications    DOBUTamine 2.5 mcg/kg/min (07/06/18 0928)    furosemide (LASIX) 5mg/ml infusion 10 mg/hr (07/06/18 1107)     Scheduled Medications    insulin lispro  0-12 Units Subcutaneous TID WC    insulin lispro  0-6 Units Subcutaneous Nightly    lactulose  10 g Oral Daily    apixaban  2.5 mg Oral BID    isosorbide mononitrate  30 mg Oral Daily    carvedilol  25 mg Oral BID    pantoprazole  40 mg Intravenous Daily     PRN Meds:       Intake/Output Summary (Last 24 hours) at 07/06/18 1144  Last data filed at 07/06/18 0546   Gross per 24 hour   Intake           340.12 ml   Output              800 ml   Net          -459.88 ml       Diet:  DIET CARDIAC; Carb Control: 4 carb choices (60 gms)/meal; Dental Soft; Daily Fluid Restriction: 1500 ml  Dietary Nutrition Supplements: Renal Oral Supplement    Exam:  BP (!) 128/58 Comment: manual  Pulse 59   Temp 97.5 °F (36.4 °C)   Resp 18   Ht 5' 5\" (1.651 m)   Wt 225 lb 6.4 oz (102.2 kg)   SpO2 98%   BMI 37.51 kg/m²     Physical Examination:   General appearance - alert, awake  and in no distress at rest, obese, on oxygen  HEENT: Normocephalic, Atraumatic, pupils reactive, mucous membranes moist  Chest - moving equally to respiration,symmetric air entry, rales present bilaterally at bases and absent sounds on right lower to auscultation  Heart - normal rate, irregular rhythm, soft S1, S2, no murmurs,   Abdomen - soft, nontender, distended,  Ascites+, BS+  Neurological - alert, oriented, normal speech, sensations intact and able to move all extremities   Extremities - peripheral pulses poor, 1-2+ pedal edema- left arm more edematous , not sig on right  Capillary refill less than 3 sec  Skin - normal coloration and turgor, no rashes        Labs:   Recent Labs      07/05/18 1715 07/06/18   0422   WBC  7.9  7.9   HGB  9.9*  9.5*   HCT  32.6*  30.1*   PLT  166  168     Recent Labs      07/05/18 1715

## 2018-07-06 NOTE — PLAN OF CARE
Problem: Falls - Risk of:  Goal: Will remain free from falls  Will remain free from falls   Outcome: Met This Shift  Pt has had no falls so far this shift. Pt bed alarm on. Problem: Risk for Impaired Skin Integrity  Goal: Tissue integrity - skin and mucous membranes  Structural intactness and normal physiological function of skin and  mucous membranes. Outcome: Ongoing  Pt has had no new skin breakdown noted this shift. Pt repositioned every 2 hours with pillow support. Problem: Pain:  Goal: Pain level will decrease  Pain level will decrease   Outcome: Ongoing  Pain Assessment: 0-10  Pain Level: 0   Pain goal:  No pain  Is pain goal met at this time? Yes     Additional interventions to be implemented: position change and rest      Problem: Discharge Planning:  Goal: Discharged to appropriate level of care  Discharged to appropriate level of care   Outcome: Ongoing  Pt plans to be discharged home with daughter. Comments: Care plan reviewed with patient and family. Patient and family verbalize understanding of the plan of care and contribute to goal setting.

## 2018-07-06 NOTE — ED NOTES
Pt helped to use bedside commode , helped back into a position of comfort in bed     Jamison Smith RN  07/05/18 2023

## 2018-07-06 NOTE — CONSULTS
MEDICATIONS:  Current inpatient medications have been reviewed. The patient is on metolazone 2.5. REVIEW OF SYSTEMS:  CONSTITUTIONAL:  No fever, chills, weakness or fatigue. SKIN:  Negative. HEENT:  Negative. EYES:  Negative. CARDIOVASCULAR:   Some chest pain and leg swelling. RESPIRATORY SYSTEM:  _____ shortness of  breath. No cough. GI:  Negative. :  Negative. MUSCULOSKELETAL:   Negative. NEUROLOGIC:  No dizziness or headache. PSYCHIATRY:  No  substance abuse or depression. PHYSICAL EXAMINATION:  GENERAL:  Well developed, well nourished, in no distress. VITAL SIGNS:  Reviewed. Show blood pressure 105/40, pulse rate of 68,  respirations 22, temperature 97.5. HEENT:  Normal external appearance of both ears and nose. Oropharynx  appears to be reasonably clear and moist.  Eyes:  Conjunctivae normal.  No  icterus. No discharge. NECK:  Range of motion normal.  Supple. Has some JVD. No thyromegaly. CARDIOVASCULAR:  Rate, rhythm and heart sounds appear to be within normal  limits. PULMONARY:  Chest effort normal.  No respiratory distress, may be a little  bit especially when talking. Mild crackles or rhonchi, mainly prominent in  the left base. ABDOMEN:  Soft, distension, nontender. MUSCULOSKELETAL:  Bilateral edema all the way up to the hips. No  tenderness. NEUROLOGIC:  Alert, oriented x3. SKIN:  Warm and dry. No erythema. No rash. PSYCH:  Mood, memory and affect appears to be within normal limits. LABORATORY DATA:  Shows a sodium 136, potassium 5.1, BUN 46, creatinine  1.3, CO2 of 1.9. Hemoglobin of 9.5. X-ray of the abdomen shows small right pleural effusion with blunting of  the right costophrenic angle, probable atelectasis with prominent vessels  in the lung base. We will obtain a chest x-ray as well. Urinalysis is concerning for a UTI as well.     Old records from the hospital have been reviewed and the patient's weight  was around 160 pounds sometime in 05/2018. Echocardiogram from 05/2018 shows an ejection fraction of 15%,  mild-to-moderate mitral stenosis. Right ventricular systolic pressure of 55 mm. ASSESSMENT AND PLAN:  1. Renal.  CKD stage 3. Overall renal functioning appears to be stable at  baseline. Electrolytes appears to be within normal limits. 2.  Acid base status, mild decrease in bicarb. Not sure whether she has  metabolic acidosis or possibly more respiratory alkalosis due to liver  dysfunction. Either ways bicarb is reasonable. We will follow for now. 3.  Fluid overload secondary to diastolic congestive heart failure with  significant pulmonary arterial hypertension as well. The patient's EF is  only 15%. She is lately almost 50 pounds positive, so at this time I will  start the patient on Lasix drip. We will obtain a chest x-ray, strict  intake and output, 1500 mL of fluid restriction, low sodium diet and I will  also start her on Dobutrex drip and see how she responds. We will discontinue metolazone for now. The patient refusing the urinary  catheter for accurate in and out. The patient needs daily standing  weights. 4.  Liver cirrhosis with ascites. She is getting paracentesis on a regular  basis. Thank you very much for the consultation. Please call with any questions  or concerns. We will follow the patient closely.         Jacqueline Arce M.D.    D: 07/06/2018 8:30:08       T: 07/06/2018 11:04:02     AM/DANIEL  Job#: [id number]     Doc#: [id number]    CC:

## 2018-07-06 NOTE — PROGRESS NOTES
Nutrition Assessment    Type and Reason for Visit: Initial, Positive Nutrition Screen (Wt Concerns; Difficulty Chewing/Swallowing)    Nutrition Recommendations: Encourage adherence to CHO Controlled, Cardiac, 2 gm Sodium Diet with 1.5L Fluid Restriction. Will start Nepro until pt's appetite returns. Continue MVI, Vitamins B, C & D along with fish oil supplementation. Pt has been educated on her diets multiple times in the past (previous hospital admissions). Malnutrition Assessment:  · Malnutrition Status: No malnutrition    Nutrition Diagnosis:   · Problem: Inadequate oral intake  · Etiology: related to recent changes in medical status     Signs and symptoms:  as evidenced by Patient report of    Nutrition Assessment:  · Subjective Assessment: Pt admitted with Anasarca. Recent UTI. Has Hx of Hypothyroidism, HTN, DMII, Liver Cirrhosis, CHF, AFib, CABG x 3. Nephro has seen pt. GI & Cardio Consults pending. Familiar with pt from past hospital admissions. Pt has been educated on Low Sodium, CHO Controlled & Heart Healthy Diets in the past. Pt seen. Sleeping. Stated that she is very tired. Would like Dental Soft diet. Stated that she monitors her weight b/o her CHF. Weight started to go up and lower extremity swelling in both legs began. Pt stated that she has gained ~50# since mid-June. Pt did have a paracentesis a few days ago and they removed 1.5 L of fluid. Pt's appetite hasn't been good d/t this, uti as well as recent lower GI issues (diarrhea). Agreeable to drink Nepro. Pt is on a fluid restriction of 1.5 L daily. Labs reviewed - BUN 46, Cr 1.3; blood sugars ranging between 150-200. Meds include lasix, IV rocephin IV dobutamine, protonix, stool softners, MVI, inslulin, oral hypoglycemic agents, fish oil supplement, Vitamin B complex, Vitamins C & D. No stools yet per pt.    · Wound Type: None  · Current Nutrition Therapies:  · Oral Diet Orders: 2gm Sodium, Carb Control 4 Carbs/Meal, Dental Soft, Cardiac, Fluid Restriction, Dental Soft   · Oral Diet intake: Unable to assess; pt stated that she ate \"some\" for breakfast; no po recorded  · Oral Nutrition Supplement (ONS) Orders: Renal Supplement  · ONS intake: Unable to assess; starting today  · Anthropometric Measures:  · Ht: 5' 5\" (165.1 cm)   · Current Body Wt: 225 lb 6.4 oz (102.2 kg)  · Admission Body Wt: 226 lb (102.5 kg)  · Usual Body Wt:  (Per EMR, pt weighed 175# on 6/11/18; pt weighed 168#6. 4oz on 5/22/18)  · % Weight Change: 50# wt gain since 6/11/18 (3 weeks) due to edema,     · Ideal Body Wt: 125 lb (56.7 kg)   · Comparative Standards (Estimated Nutrition Needs): Used UBW (average) of 170#  · Estimated Daily Total Kcal:  ~1700kcal/day  · Estimated Daily Protein (g):  ~46 - 62 grams/day (monitor renal fx)    Estimated Intake vs Estimated Needs: Insufficient Data    Nutrition Risk Level: High    Nutrition Interventions:   Continue current diet, Mineral Supplement, Vitamin Supplement, Start ONS  Continued Inpatient Monitoring, Education Not Indicated    Nutrition Evaluation:   · Evaluation: Goals set   · Goals: Pt will adhere to diet orders and be able to eat at least 75% of her meals    · Monitoring: Meal Intake, Supplement Intake, Diet Tolerance, Skin Integrity, Fluid Balance, Ascites/Edema, Weight, Comparative Standards, Pertinent Labs, Chewing/Swallowing    See Adult Nutrition Doc Flowsheet for more detail.      Electronically signed by Mary Jo Phillip RD, LD on 7/6/18 at 10:18 AM    Contact Number: [phone]

## 2018-07-06 NOTE — CARE COORDINATION
7/6/18, 2:29 PM      Vickie Warner       Admitted from: ED 7/5/2018/ Kel Garcia day: 1   Location: [address] [place] [address] Reason for admit: Oscar [R60.1] Status: IP  Admit order signed?: yes  PMH:  has a past medical history of Atrial fibrillation ([facility] [address] Cerebral artery occlusion with cerebral infarction [facility]); CHF (congestive heart failure) ([facility] [address] Cirrhosis ([facility] [address] Diabetes mellitus ([facility] Hypertension; Hypothyroid; Pneumonia; and Thyroid disease. Procedure: 7/6 ECHO  Pertinent abnormal Imaging:none  Medications:  Scheduled Meds:   [START ON 7/7/2018] cefTRIAXone (ROCEPHIN) IV  1 g Intravenous Q24H    vitamin C  500 mg Oral Daily    vitamin D  1,000 Units Oral Daily    multivitamin  1 tablet Oral Daily    FLUoxetine  20 mg Oral Daily    cholestyramine light  4 g Oral Daily    insulin glargine  10 Units Subcutaneous Nightly    [START ON 7/7/2018] levothyroxine  88 mcg Oral Daily    insulin lispro  0-12 Units Subcutaneous TID WC    lactulose  10 g Oral Daily    apixaban  2.5 mg Oral BID    isosorbide mononitrate  30 mg Oral Daily    carvedilol  25 mg Oral BID    pantoprazole  40 mg Intravenous Daily     Continuous Infusions:   DOBUTamine 2.5 mcg/kg/min (07/06/18 0928)    furosemide (LASIX) 5mg/ml infusion 10 mg/hr (07/06/18 1107)    dextrose        Pertinent Info/Orders/Treatment Plan:  GI/Cardiology/Palliative Care following, (+) UA: Gram Negative Bacilli, elevated renal labs, elevated BNP; monitor. Dobutrex/Lasix gtts/Oxygen 3L continued  Diet: DIET CARDIAC; Carb Control: 4 carb choices (60 gms)/meal; Dental Soft; Daily Fluid Restriction: 1500 ml  Dietary Nutrition Supplements: Renal Oral Supplement   DVT Prophylaxis: Heparin  Smoking status:  reports that she has never smoked.  She has never used smokeless tobacco.   Influenza Vaccination Screening Completed: n/a  Pneumonia Vaccination Screening Completed: no, updated nsg  PCP: LEMUEL Mayorga - CNP  Readmission: none  Readmission Risk Score:

## 2018-07-07 NOTE — PROGRESS NOTES
Hospitalist Progress Note    Patient:  Tyron Maravilla      Unit/Bed:4K-12/012-A    YOB: 1940    MRN: [id number]       Acct: [de-identified]     PCP: LEMUEL Montejo CNP    Date of Admission: 7/5/2018    Chief Complaint:   Chief Complaint   Patient presents with    Other     swelling all over       Subjective:   Dyspnea improving. Continues to have LE edema  Good UOP    Medications:  Reviewed    Infusion Medications    DOBUTamine 2.5 mcg/kg/min (07/06/18 0928)    furosemide (LASIX) 5mg/ml infusion 10 mg/hr (07/06/18 1107)    dextrose       Scheduled Medications    cefTRIAXone (ROCEPHIN) IV  1 g Intravenous Q24H    vitamin C  500 mg Oral Daily    vitamin D  1,000 Units Oral Daily    multivitamin  1 tablet Oral Daily    FLUoxetine  20 mg Oral Daily    cholestyramine light  4 g Oral Daily    insulin glargine  10 Units Subcutaneous Nightly    levothyroxine  88 mcg Oral Daily    apixaban  2.5 mg Oral BID    rifaximin  550 mg Oral BID    insulin lispro  0-12 Units Subcutaneous TID WC    lactulose  10 g Oral Daily    isosorbide mononitrate  30 mg Oral Daily    carvedilol  25 mg Oral BID    pantoprazole  40 mg Intravenous Daily     PRN Meds: traZODone, glucose, dextrose, glucagon (rDNA), dextrose      Intake/Output Summary (Last 24 hours) at 07/07/18 1259  Last data filed at 07/07/18 1220   Gross per 24 hour   Intake          1985.87 ml   Output             9150 ml   Net         -7164.13 ml       Diet:  DIET CARDIAC; Carb Control: 4 carb choices (60 gms)/meal; Dental Soft; Daily Fluid Restriction: 1500 ml  Dietary Nutrition Supplements: Renal Oral Supplement    Exam:  /62   Pulse 62   Temp 97.8 °F (36.6 °C) (Oral)   Resp 18   Ht 5' 5\" (1.651 m)   Wt 221 lb 8 oz (100.5 kg)   SpO2 97%   BMI 36.86 kg/m²     Gen/overall appearance: Not in acute distress. Alert.   Head: Normocephalic, atraumatic  Eyes: EOMI, no scleral icterus  CVS: regular rate and rhythm, Normal Cirrhosis; ascites. COMPARISON: 6/19/2018. TECHNIQUE: Grayscale sonographic imaging of the 4 quadrants of the abdomen performed for localization of ascites. FINDINGS: 1. There is a small amount of ascites within the abdomen however this is insufficient quantity to safely perform an ultrasound-guided paracentesis. This was discussed with the patient who will return next week for her scheduled appointment. 1. There is a small amount of ascites within the abdomen however this is insufficient quantity to safely perform an ultrasound-guided paracentesis. This was discussed with the patient who will return next week for her scheduled appointment. **This report has been created using voice recognition software. It may contain minor errors which are inherent in voice recognition technology. ** Final report electronically signed by Dr. Faheem Culver on 6/26/2018 12:22 PM    Us Guided Paracentesis    Result Date: 7/3/2018  PROCEDURE: US GUIDED PARACENTESIS CLINICAL INFORMATION: Cirrhosis; ascites. COMPARISON: 6/19/2018. PHYSICIAN PERFORMING PROCEDURE: Kathy Marte M.D. PROCEDURE:  Informed consent was obtained. Limited sonographic imaging of the abdomen was performed for localization of ascites. The skin along the lateral margin of the right upper abdomen was marked over a pocket of ascites. The puncture site was prepped and draped in a sterile fashion and locally anesthetized with 2% lidocaine. The distal tip of a 5 Armenian one-step catheter was advanced into the ascites. An 80 ml specimen was obtained to be sent to the laboratory for analysis as per the orders of the referring physician. .  A total of 1.4 L clear yellow ascites was then aspirated and discarded. The 5 Armenian one-step catheter was then removed. The patient tolerated the procedure well and and there were no immediate complications. 1.  Uncomplicated diagnostic and therapeutic paracentesis.  **This report has been created using voice recognition software. It may contain minor errors which are inherent in voice recognition technology. ** Final report electronically signed by Dr. Don Espinosa on 7/3/2018 3:02 PM    Us Guided Paracentesis    Result Date: 6/19/2018  PROCEDURE: US GUIDED PARACENTESIS CLINICAL INFORMATION: Ascites. COMPARISON: 6/10/2018. PHYSICIAN PERFORMING PROCEDURE: Tasha Edwards M.D. PROCEDURE:  Informed consent was obtained. Limited sonographic imaging of the abdomen was performed for localization of ascites. The skin along the lateral margin of the right upper abdomen was marked over a pocket of ascites. The puncture site was prepped and draped in a sterile fashion and locally anesthetized with 2% lidocaine. The distal tip of a 5 English one-step catheter was advanced into the ascites. An 80 ml specimen was obtained to be sent to the laboratory for analysis as per the orders of the referring caregiver. Only 50 mL of clear yellow ascites could be aspirated. The 5 English one-step catheter was then removed. The skin along the lateral margin of the right lower abdomen was then marked over a pocket of ascites. The puncture site was prepped and draped in a sterile fashion and locally anesthetized with 2% lidocaine. The distal tip of a 5 English one-step catheter was advanced into the ascites. An additional 2.45 L of clear yellow ascites was aspirated. The 5 English one-step catheter was then removed. The patient tolerated the procedure well and and there were no immediate complications. 1.  Uncomplicated diagnostic and therapeutic paracentesis. 2. A total of 2.5 L of clear yellow ascites was aspirated. 3. An 80 mL specimen was obtained to be sent to the laboratory for analysis as per the orders of the referring caregiver. **This report has been created using voice recognition software. It may contain minor errors which are inherent in voice recognition technology. ** Final report electronically signed by Dr. Don Espinosa on 6/19/2018 1:27 PM    Us Guided Paracentesis    Result Date: 6/10/2018  PROCEDURE: Ultrasound-guided paracentesis. CLINICAL INFORMATION: Ascites. COMPARISON: Paracentesis dated June 5, 2018. PROCEDURE: The benefits, alternatives and the risks of the procedure were discussed with the patient. Verbal and signed consent was obtained. A timeout was performed to confirm the correct patient, procedure and site. Limited ultrasound exam of the abdomen showed a moderate amount of ascites. The right side of the abdomen was prepped and draped using the usual sterile technique and the skin was anesthetized with 2% local lidocaine. A 5 Divehi one-step catheter was introduced to the peritoneal ascites. Return was clear , yellow fluid. No labs requested. No specimen obtained. Approximately 1.75 L of fluid drained. The catheter was then removed. The patient tolerated the procedure well with no complications reported and was discharged from the radiology department in a stable condition. Ultrasound-guided paracentesis. Approximately, 1.75 L of fluid drained. **This report has been created using voice recognition software. It may contain minor errors which are inherent in voice recognition technology. ** Final report electronically signed by Dr. Isai Goff on 6/10/2018 2:18 PM      Diet: DIET CARDIAC; Carb Control: 4 carb choices (60 gms)/meal; Dental Soft; Daily Fluid Restriction: 1500 ml  Dietary Nutrition Supplements: Renal Oral Supplement     Disposition:    [x] Home       [] TCU       [] Rehab       [] Psych       [] SNF       [] [place]       [] Other-    Code Status: Limited    Assessment/Plan:    Active Hospital Problems    Diagnosis Date Noted    Hypervolemia due to congestive heart failure (HCC) [E87.70, I50.9]     CKD stage 3 due to type 2 diabetes mellitus ([facility] [address] [E11.22, N18.3]     Coronary artery disease involving coronary bypass graft of native heart without angina pectoris [I25.810]     Paroxysmal

## 2018-07-07 NOTE — PLAN OF CARE
Problem: Falls - Risk of:  Goal: Will remain free from falls  Will remain free from falls   Outcome: Ongoing  No falls so far this shift. Patient out of bed every 20- 30 minutes to use bathroom, gait steady with use of walker. Patient is on a Lasix gtt. Fall precautions in place, call light within reach, bed in lowest position, side rails up x2, bed alarm on, non-skid socks. Hourly rounding in place. Problem: Risk for Impaired Skin Integrity  Goal: Tissue integrity - skin and mucous membranes  Structural intactness and normal physiological function of skin and  mucous membranes. Outcome: Ongoing  Patient able to turn self in bed. Pillows used for support. Redness noted to buttock. Continue to monitor. Problem: Pain:  Goal: Pain level will decrease  Pain level will decrease   Outcome: Ongoing  Patient denies any pain so far this shift. Will continue to monitor. Problem: Discharge Planning:  Goal: Discharged to appropriate level of care  Discharged to appropriate level of care   Outcome: Ongoing  Patient on 4K for care. Discharge planning in process. Patient lives at home with daughter and has home health. Problem: Nutrition  Goal: Optimal nutrition therapy  Outcome: Ongoing  Patient on a cardiac, carb control diet with 1500 ml fluid restriction. Tolerating. Comments: Care plan reviewed with patient and patient's daughter. Patient and patient's daughter verbalized understanding of the plan of care and contribute to goal setting.

## 2018-07-07 NOTE — CONSULTS
[address] [place] [address]                                   CONSULTATION    PATIENT NAME: Brandon Martin              :        1940  MED REC NO:   [id number]                           ROOM:       0012  ACCOUNT NO:   [de-identified]                           ADMIT DATE: 2018  PROVIDER:     Ian Durand M.D.    Luisneelima Brennant:  2018    REASON FOR THE CONSULT:  Congestive heart failure, fluid overload. REQUESTING PROVIDER:  Hospitalist Service. HISTORY OF PRESENT ILLNESS:  This is a very unfortunate 58-year-old lady,  who has an extensive cardiac history, history of coronary artery disease,  cardiomyopathy, history of defibrillator, and last ejection fraction of  15%, history of renal disorder as well. The patient looks like has not  followed with Cardiology in a period of time and looks like she might  follow with an outside cardiologist.  She also has a history of liver  disease, comes in with worsening shortness of breath and what looks like  anasarca type of picture with generalized edematous changes. We were  consulted to assist in management of the patient. REVIEW OF SYSTEMS:  No fever, no chills, no weight loss. No hematuria or  dysuria. No abdominal pain, nausea, vomiting, or diarrhea except for the  last few days. No obvious suicidal ideation. The patient has some  underlying depression. No skin rashes. No obvious dizziness,  lightheadedness, or loss of consciousness. No recent trauma. No bleeding  problem. No HEENT related problems. PAST MEDICAL HISTORY:  1. Coronary artery disease. 2.  Cardiomyopathy. 3.  Diabetes. 4.  Hypertension. 5.  Hyperlipidemia. 6.  Renal disease. ALLERGIES:  CODEINE, STATIN, as well as ACE INHIBITORS.     CURRENT MEDICATION:  Coreg 25 mg twice a day, Eliquis 2.5 mg twice a day,  insulin, isosorbide 30 mg a day, levothyroxine, Protonix 40 mg a

## 2018-07-07 NOTE — PROGRESS NOTES
[facility]   Nephrology progress note  7/7/2018, 10:49 AM      Pt Name:    Shoshana Batista  MRN:     [id number]     YOB: 1940  Admit Date:    7/5/2018  4:10 PM  Primary Care Physician:  LEMUEL Zeng CNP   Room number  4K-12/012-A    Chief Complaint: Nephrology following for fluid overload and diuretic mgmt    Subjective:  Patient seen and examined  No chest pain or shortness of breath  Urinating well  Good urine output  Wt 221 today, was 227 two days ago    Objective:  24HR INTAKE/OUTPUT:    Intake/Output Summary (Last 24 hours) at 07/07/18 1049  Last data filed at 07/07/18 1031   Gross per 24 hour   Intake          1745.87 ml   Output             8550 ml   Net         -6804.13 ml     I/O last 3 completed shifts: In: 1415.9 [P.O.:1140; I.V.:275.9]  Out: 7100 [Urine:7100]  I/O this shift:  In: 380 [P.O.:380]  Out: 1450 [Urine:1450]  Admission weight: 227 lb (103 kg)  Wt Readings from Last 3 Encounters:   07/07/18 221 lb 8 oz (100.5 kg)   06/11/18 175 lb (79.4 kg)   06/10/18 168 lb (76.2 kg)     Body mass index is 36.86 kg/m².     Physical examination  VITALS:     Vitals:    07/07/18 0328   BP: 131/62   Pulse: 62   Resp: 18   Temp: 97.8 °F (36.6 °C)   SpO2: 97%     General Appearance: alert and cooperative with exam, appears comfortable, no distress  Mouth/Throat: Oral mucosa moist  Neck: No JVD  Lungs: Air entry B/L, no rales, no use of accessory muscles  Heart:  S1, S2 heard  Extremities: B/L LE edema      Lab Data  CBC:   Recent Labs      07/05/18   1715  07/06/18   0422   WBC  7.9  7.9   HGB  9.9*  9.5*   HCT  32.6*  30.1*   PLT  166  168     BMP:  Recent Labs      07/06/18   0027  07/06/18   0422  07/07/18   0546   NA  137  136  135   K  5.2  5.1  4.5   CL  109  109  105   CO2  17*  19*  20*   BUN  46*  46*  52*   CREATININE  1.2  1.3*  1.3*   GLUCOSE  151*  192*  98   CALCIUM  8.3*  8.0*  8.6   MG   --    --   2.1   PHOS   --   3.6   --      Hepatic: Recent Labs 07/05/18   1715   LABALBU  2.7*   AST  16   ALT  10*   BILITOT  0.6   ALKPHOS  153*         Meds:  Infusion:    DOBUTamine 2.5 mcg/kg/min (07/06/18 0928)    furosemide (LASIX) 5mg/ml infusion 10 mg/hr (07/06/18 1107)    dextrose       Meds:    cefTRIAXone (ROCEPHIN) IV  1 g Intravenous Q24H    vitamin C  500 mg Oral Daily    vitamin D  1,000 Units Oral Daily    multivitamin  1 tablet Oral Daily    FLUoxetine  20 mg Oral Daily    cholestyramine light  4 g Oral Daily    insulin glargine  10 Units Subcutaneous Nightly    levothyroxine  88 mcg Oral Daily    apixaban  2.5 mg Oral BID    rifaximin  550 mg Oral BID    insulin lispro  0-12 Units Subcutaneous TID WC    lactulose  10 g Oral Daily    isosorbide mononitrate  30 mg Oral Daily    carvedilol  25 mg Oral BID    pantoprazole  40 mg Intravenous Daily     Meds prn: traZODone, glucose, dextrose, glucagon (rDNA), dextrose       Impression and Plan:  1. CKD III: stable creatinine  2. Volume overload secondary to systolic congestive heart failure/cirrhosis  - she is on dobutrex and lasix drip  - good diuresis, weights improving  - continue with diuretics  - potassium check every 12 hours  - add magnesium level as well  3. Cirrhosis with ascites  4. Hyperkalemia: resolved.    5. DM    D/W patient    Eugenio Griffin MD  [facility]

## 2018-07-07 NOTE — PROGRESS NOTES
Gastrointestinal Progress Note      Patient:  Brain Lipps  Unit/Bed:4K-12/012-A       YOB: 1940    MRN: [id number]                      Acct: [de-identified]     Admit date: 7/5/2018      Subjective:  Patient on [facility] and then to chair. Tech in room. Patient wants to get up and walk later so tech said that she would be able to assist pt with this. Pt said that she is a little \"wobbly\" since she has [place] on my a** since Thursday\" and then she laughed   She said that she is ready to go home and that her edema is much improved. Objective:       CBC:   Recent Labs      07/05/18 1715 07/06/18   0422   WBC  7.9  7.9   HGB  9.9*  9.5*   PLT  166  168     BMP:    Recent Labs      07/06/18   0027  07/06/18   0422  07/07/18   0546   NA  137  136  135   K  5.2  5.1  4.5   CL  109  109  105   CO2  17*  19*  20*   BUN  46*  46*  52*   CREATININE  1.2  1.3*  1.3*   GLUCOSE  151*  192*  98     Calcium:  Recent Labs      07/07/18   0546   CALCIUM  8.6     Ionized Calcium:No results for input(s): IONCA in the last 72 hours. Magnesium:  Recent Labs      07/07/18   0546   MG  2.1     Phosphorus:  Recent Labs      07/06/18   0422   PHOS  3.6     INR:   Recent Labs      07/05/18   1715   INR  1.26*     Hepatic:   Recent Labs      07/05/18   1715   ALKPHOS  153*   ALT  10*   AST  16   PROT  5.9*   BILITOT  0.6   BILIDIR  0.3   LABALBU  2.7*     Amylase and Lipase:  Recent Labs      07/05/18   1715   LACTA  1.2     Lactic Acid:   Recent Labs      07/05/18   1715   LACTA  1.2             Physical Exam:  Vitals:    07/07/18 0328   BP: 131/62   Pulse: 62   Resp: 18   Temp: 97.8 °F (36.6 °C)   SpO2: 97%     GEN: Well nourished, well developed. LUNGS:  Clear to auscultation bilaterally. Chest rises equally on inspiration. CARDIOVASCULAR:  Regular rate and rhythm without murmurs, rubs or gallops. ABDOMEN:  Soft, round, slightly larger, nontender and slight distended with normal bowel sounds.     HEAD:

## 2018-07-08 NOTE — PROGRESS NOTES
2.1  2.0  2.0   PHOS  3.6   --    --    --      Hepatic:   Recent Labs      07/05/18   1715   LABALBU  2.7*   AST  16   ALT  10*   BILITOT  0.6   ALKPHOS  153*         Meds:  Infusion:    DOBUTamine 2.5 mcg/kg/min (07/07/18 1703)    furosemide (LASIX) 5mg/ml infusion 10 mg/hr (07/07/18 2039)    dextrose       Meds:    cefTRIAXone (ROCEPHIN) IV  1 g Intravenous Q24H    vitamin C  500 mg Oral Daily    vitamin D  1,000 Units Oral Daily    multivitamin  1 tablet Oral Daily    FLUoxetine  20 mg Oral Daily    cholestyramine light  4 g Oral Daily    insulin glargine  10 Units Subcutaneous Nightly    levothyroxine  88 mcg Oral Daily    apixaban  2.5 mg Oral BID    rifaximin  550 mg Oral BID    insulin lispro  0-12 Units Subcutaneous TID WC    lactulose  10 g Oral Daily    isosorbide mononitrate  30 mg Oral Daily    carvedilol  25 mg Oral BID    pantoprazole  40 mg Intravenous Daily     Meds prn: traZODone, glucose, dextrose, glucagon (rDNA), dextrose       Impression and Plan:  1. CKD III: creat 1.5 and slightly higher since yesterday but still acceptable, will monitor  2. Volume overload secondary to systolic congestive heart failure/cirrhosis  - excellent diuresis on dobutrex and lasix drip  - will reduce lasix to 5 mg/hr and stop tomorrow  - check labs in am  - monitor K atleast every 12 hours while on drip  - low salt and fluid restriction    3. Cirrhosis with ascites  4. Hyperkalemia: resolved.    5. DM    D/W patient    Peggy Jones MD  [facility]

## 2018-07-08 NOTE — PROGRESS NOTES
Problem: Falls - Risk of:  Goal: Absence of physical injury  Absence of physical injury   Outcome: Ongoing  Patient is free from falls this shift. Uses call light appropriately. Chair or bed alarm on at all times. Call light within reach at all times.      Problem: Risk for Impaired Skin Integrity  Goal: Tissue integrity - skin and mucous membranes  Structural intactness and normal physiological function of skin and  mucous membranes. Outcome: Ongoing  Skin assessed every 4 hours. No open areas on coccyx at this time. Bilateral lower legs with redness. Encouraged patient to elevate.      Problem: Pain:  Goal: Pain level will decrease  Pain level will decrease   Outcome: Ongoing  Patient pain assessed every hour on hourly rounds. Patient denies pain at this time. Medication to be given if requested by patient as per physician order.      Problem: Discharge Planning:  Goal: Discharged to appropriate level of care  Discharged to appropriate level of care   Outcome: Ongoing  When ready patient will be discharged home with daughter.      Comments: Care plan reviewed with patient and family.   Patient and family verbalize understanding of the plan of care and contribute to goal setting.

## 2018-07-08 NOTE — PROGRESS NOTES
Gastrointestinal Progress Note      Patient:  Shivam Yepez  Unit/Bed:4K-12/012-A       YOB: 1940    MRN: [id number]                      Acct: [de-identified]     Admit date: 7/5/2018      Subjective:  Patient sitting in bed with eyes closed. Woke easily. Said that she slept well despite having to get up to urinate \" a bazillion\" times. She is doing well today. No complaints. Objective:       CBC:   Recent Labs      07/05/18   1715 07/06/18   0422   WBC  7.9  7.9   HGB  9.9*  9.5*   PLT  166  168     BMP:    Recent Labs      07/06/18   0027  07/06/18   0422  07/07/18   0546  07/07/18   1818   NA  137  136  135   --    K  5.2  5.1  4.5  4.3   CL  109  109  105   --    CO2  17*  19*  20*   --    BUN  46*  46*  52*   --    CREATININE  1.2  1.3*  1.3*   --    GLUCOSE  151*  192*  98   --      Calcium:  Recent Labs      07/07/18   0546   CALCIUM  8.6     Ionized Calcium:No results for input(s): IONCA in the last 72 hours. Magnesium:  Recent Labs      07/07/18   1818   MG  2.0     Phosphorus:  Recent Labs      07/06/18 0422   PHOS  3.6     INR:   Recent Labs      07/05/18   1715   INR  1.26*     Hepatic:   Recent Labs      07/05/18 1715   ALKPHOS  153*   ALT  10*   AST  16   PROT  5.9*   BILITOT  0.6   BILIDIR  0.3   LABALBU  2.7*     Amylase and Lipase:  Recent Labs      07/05/18   1715   LACTA  1.2     Lactic Acid:   Recent Labs      07/05/18   1715   LACTA  1.2             Physical Exam:  Vitals:    07/08/18 0736   BP: (!) 146/63   Pulse: 72   Resp: 18   Temp: 97.6 °F (36.4 °C)   SpO2: 99%     GEN: Well nourished, well developed. LUNGS:  Clear to auscultation bilaterally. Chest rises equally on inspiration. CARDIOVASCULAR:  Regular rate and rhythm without murmurs, rubs or gallops. ABDOMEN:  Soft,slightly larger,  nontender and nondistended with normal bowel sounds. HEAD:  Normal cephalic/atraumatic. NECK:  Neck supple.  Trachea midline      UPPER EXTREMITIES:  No cyanosis, weeks.   · Gi signing off.         Any concerns, please page DR Terence Dandy since he is on call this weekend         Hospitalist/Attending provider notes, consulting physician notes, laboratory results and procedure notes reviewed prior to seeing the patient.    Note done in collaboration with DR Terence Dandy, MD.   Electronically signed by LEMUEL Thompson CNP on 7/8/18 at 10:19 AM

## 2018-07-08 NOTE — PROGRESS NOTES
rhythm, Normal S1S2  Pulm: Clear to auscultation bilaterally. No crackles/wheezes, diminished in bases  Gastrointestinal: Soft, nontender, obese, no guarding or rebound  Extremities: bilat 2+ edema. No erythema or warmth  Neuro: No gross focal deficits noted     Labs:   Recent Labs      07/05/18   1715  07/06/18   0422   WBC  7.9  7.9   HGB  9.9*  9.5*   HCT  32.6*  30.1*   PLT  166  168     Recent Labs      07/06/18   0422  07/07/18   0546  07/07/18   1818  07/08/18   0714   NA  136  135   --   137   K  5.1  4.5  4.3  4.3   CL  109  105   --   101   CO2  19*  20*   --   19*   BUN  46*  52*   --   55*   CREATININE  1.3*  1.3*   --   1.5*   CALCIUM  8.0*  8.6   --   8.5   PHOS  3.6   --    --    --      Recent Labs      07/05/18   1715   AST  16   ALT  10*   BILIDIR  0.3   BILITOT  0.6   ALKPHOS  153*     Recent Labs      07/05/18   1715   INR  1.26*     Recent Labs      07/06/18   0422   CKTOTAL  43       Urinalysis:      Lab Results   Component Value Date    NITRU NEGATIVE 07/05/2018    WBCUA > 100 07/05/2018    BACTERIA MANY 07/05/2018    RBCUA 0-2 07/05/2018    BLOODU NEGATIVE 07/05/2018    GLUCOSEU NEGATIVE 07/05/2018       Radiology:  Xr Acute Abd Series Chest 1 Vw    Result Date: 7/5/2018  PROCEDURE: XR ACUTE ABD SERIES CHEST 1 VW CLINICAL INFORMATION: History of cirrhosis and ascites, shortness of breath,  . COMPARISON: June 28, 2018 TECHNIQUE: A PA upright view of the chest was obtained. AP and supine views of the abdomen were obtained. FINDINGS: Chest: Permanent pacer. Median sternotomy wires. Cardiomegaly. Blunting of the right costophrenic angle. Possible opacity versus atelectasis in the left lung base. Prominent vasculature can also be a consideration. Degenerative changes thoracic spine and  both shoulders. Thoracic scoliosis. Abdomen: Nonspecific bowel gas pattern. Scattered air within bowel loops. No fluid level identified. Degenerative changes thoracolumbar spine with scoliosis.  SI joints are Limited    Result Date: 6/26/2018  PROCEDURE: US ABDOMEN LIMITED CLINICAL INFORMATION: Cirrhosis; ascites. COMPARISON: 6/19/2018. TECHNIQUE: Grayscale sonographic imaging of the 4 quadrants of the abdomen performed for localization of ascites. FINDINGS: 1. There is a small amount of ascites within the abdomen however this is insufficient quantity to safely perform an ultrasound-guided paracentesis. This was discussed with the patient who will return next week for her scheduled appointment. 1. There is a small amount of ascites within the abdomen however this is insufficient quantity to safely perform an ultrasound-guided paracentesis. This was discussed with the patient who will return next week for her scheduled appointment. **This report has been created using voice recognition software. It may contain minor errors which are inherent in voice recognition technology. ** Final report electronically signed by Dr. Ramya Koch on 6/26/2018 12:22 PM    Us Guided Paracentesis    Result Date: 7/3/2018  PROCEDURE: US GUIDED PARACENTESIS CLINICAL INFORMATION: Cirrhosis; ascites. COMPARISON: 6/19/2018. PHYSICIAN PERFORMING PROCEDURE: Sabiha Bazzi M.D. PROCEDURE:  Informed consent was obtained. Limited sonographic imaging of the abdomen was performed for localization of ascites. The skin along the lateral margin of the right upper abdomen was marked over a pocket of ascites. The puncture site was prepped and draped in a sterile fashion and locally anesthetized with 2% lidocaine. The distal tip of a 5 Arabic one-step catheter was advanced into the ascites. An 80 ml specimen was obtained to be sent to the laboratory for analysis as per the orders of the referring physician. .  A total of 1.4 L clear yellow ascites was then aspirated and discarded. The 5 Arabic one-step catheter was then removed. The patient tolerated the procedure well and and there were no immediate complications.      1.  Uncomplicated diagnostic and technology. ** Final report electronically signed by Dr. Ramya Koch on 6/19/2018 1:27 PM    Us Guided Paracentesis    Result Date: 6/10/2018  PROCEDURE: Ultrasound-guided paracentesis. CLINICAL INFORMATION: Ascites. COMPARISON: Paracentesis dated June 5, 2018. PROCEDURE: The benefits, alternatives and the risks of the procedure were discussed with the patient. Verbal and signed consent was obtained. A timeout was performed to confirm the correct patient, procedure and site. Limited ultrasound exam of the abdomen showed a moderate amount of ascites. The right side of the abdomen was prepped and draped using the usual sterile technique and the skin was anesthetized with 2% local lidocaine. A 5 Urdu one-step catheter was introduced to the peritoneal ascites. Return was clear , yellow fluid. No labs requested. No specimen obtained. Approximately 1.75 L of fluid drained. The catheter was then removed. The patient tolerated the procedure well with no complications reported and was discharged from the radiology department in a stable condition. Ultrasound-guided paracentesis. Approximately, 1.75 L of fluid drained. **This report has been created using voice recognition software. It may contain minor errors which are inherent in voice recognition technology. ** Final report electronically signed by Dr. Carrie Bentley on 6/10/2018 2:18 PM      Diet: DIET CARDIAC; Carb Control: 4 carb choices (60 gms)/meal; Dental Soft; Daily Fluid Restriction: 1500 ml  Dietary Nutrition Supplements: Renal Oral Supplement     Disposition:    [x] Home       [] TCU       [] Rehab       [] Psych       [] SNF       [] [place]       [] Other-    Code Status: Limited    Assessment/Plan:    Active Hospital Problems    Diagnosis Date Noted    Hypervolemia due to congestive heart failure (HCC) [E87.70, I50.9]     CKD stage 3 due to type 2 diabetes mellitus ([facility] [address] [E11.22, N18.3]     Coronary artery disease involving coronary bypass graft of native heart without angina pectoris [I25.810]     Paroxysmal atrial fibrillation (HCC) [I48.0]     Anasarca [R60.1] 11/02/2016     Hypervolemia and diffuse anasarca secondary to acute on chronic CHF, cirrhosis and chronic kidney disease  Acute on chronic systolic congestive heart failure, EF of 15%  Chronic kidney disease stage III  Likely cardiorenal syndrome  Cirrhosis of liver with ascites  Chronic respiratory failure hypoxic and hypercapnic  PMH of PAF, CAD s/p CABG, PAD, COPD, DM, CVA    Titrate lasix GTT and dobutamine GTT per nephro  Strict ins/outs  Fluid restriction  Monitor lytes and Cr  C/w lactulose, xifaxan  Palliative on board  O2 per protocol  PT/OT    Electronically signed by Jeremiah Overton MD on 7/8/2018 at 3:51 PM

## 2018-07-09 NOTE — PLAN OF CARE
Problem: Falls - Risk of:  Goal: Will remain free from falls  Will remain free from falls   Outcome: Ongoing  Patient remains on fall precautions. Falling star in place. Fall band on. Non-skid slippers on when ambulating. Making use of IV pole when up ambulating  Side rails up x 2. Call light within reach. Patient uses call light appropriately. Bed alarm on. Hourly rounding completed. Problem: Risk for Impaired Skin Integrity  Goal: Tissue integrity - skin and mucous membranes  Structural intactness and normal physiological function of skin and  mucous membranes. Outcome: Ongoing  No new skin break down noted. Patient is able to turn and reposition without assist.  Sitting up in chair with frequent repositioning     Problem: Pain:  Goal: Pain level will decrease  Pain level will decrease   Outcome: Ongoing  Denies any pain at this time. Problem: Discharge Planning:  Goal: Discharged to appropriate level of care  Discharged to appropriate level of care   Outcome: Ongoing  Planning to discharge home with daughter when medically ready. Comments: Care Plan reviewed in detail with patient. Patient is verbalized understanding and is compliant with current treatment and care plan.

## 2018-07-09 NOTE — CARE COORDINATION
7/9/18, 1:41 PM      R[place] Blas day: 4  Location: [facility]12/012 Reason for admit: Anasarca [R60.1]   Procedure: none  Treatment Plan of Care: Lasix/Dobutrex gtts/Oxygen 3L continued.  Diuresis continued  PCP: LEMUEL Segura CNP  Readmission Risk Score: 35%  Discharge Plan: plans home with family, [facility] as PTA; monitor for home oxygen eval if not weaned off; will ask physician

## 2018-07-09 NOTE — PROGRESS NOTES
Nutrition Assessment    Type and Reason for Visit: Reassess    Nutrition Recommendations: Continue current diet, MVI. Malnutrition Assessment:  · Malnutrition Status: No malnutrition    Nutrition Diagnosis:   · Problem: Inadequate oral intake  · Etiology: related to Insufficient energy/nutrient consumption     Signs and symptoms:  as evidenced by Diet history of poor intake    Nutrition Assessment:  · Subjective Assessment: Pt. seen. Reports appetite is \"fine\". States eating everything. Reports acceptance of Nepro but requesting chocolate flavor and [facility] does not carry. Agrees to Glucerna ONS BID. Discussed FR and low sodium diet. Pt. has received diet education many times. States watches her salt and sodium; daughter does the cooking. 7/9: Glucose 237 mg/dl, BUN 59, Cr 1.4. Rx includes MVI, Lactulose, Lasix, Insulin. · Wound Type: None  · Current Nutrition Therapies:  · Oral Diet Orders: 2gm Sodium, Carb Control 4 Carbs/Meal, Dental Soft, Cardiac, Fluid Restriction   · Oral Diet intake: %  · Oral Nutrition Supplement (ONS) Orders: Renal Supplement  · ONS intake: Unable to assess (reports acceptance)  · Anthropometric Measures:  · Ht: 5' 5\" (165.1 cm)   · Current Body Wt: 215 lb 6.4 oz (97.7 kg) (7/9, +1 edema)  · Admission Body Wt: 226 lb (102.5 kg)  · Usual Body Wt:  (Per EMR, pt weighed 175# on 6/11/18; pt weighed 168#6. 4oz on 5/22/18)  · % Weight Change: 50# wt gain since 6/11/18 (3 weeks) due to edema,     · Ideal Body Wt: 125 lb (56.7 kg),   · BMI Classification: BMI 35.0 - 39.9 Obese Class II  · Comparative Standards (Estimated Nutrition Needs):  · Estimated Daily Total Kcal: 1700 kcals/day  · Estimated Daily Protein (g): 46-62 gm (monitor renal function)    Estimated Intake vs Estimated Needs: Intake Improving    Nutrition Risk Level: Moderate    Nutrition Interventions:   Continue current diet, Modify current ONS - decrease frequency to BID; change to lower CHO ONS.   Continued Inpatient Monitoring, Education Initiated (7/9 Encouraged compliance with low sodium diet, fluid restriction)    Nutrition Evaluation:   · Evaluation: Progressing toward goals   · Goals: Pt will adhere to diet orders and be able to eat at least 75% of her meals    · Monitoring: Meal Intake, Supplement Intake, Diet Tolerance, Ascites/Edema, Weight, Pertinent Labs, Patient/Family Education    See Adult Nutrition Doc Flowsheet for more detail.      Electronically signed by Valentino Bender RD, CAREN on 7/9/18 at 3:27 PM    Contact Number: [phone]

## 2018-07-09 NOTE — PROGRESS NOTES
Patient refuses to turn as recommended. Patient states that she is turning herself in bed. She refusing turns by nursing staff with pillow placement. Education provided regarding the benefits of repositioning and the risk to skin integrity associated with not repositioning as recommended.

## 2018-07-10 NOTE — PLAN OF CARE
Problem: Falls - Risk of:  Goal: Will remain free from falls  Will remain free from falls   Outcome: Ongoing  No falls thus far this shift. Patient ambulates with one assist and a walker. Patient has been educated on fall precautions and to not get up without assistance. Patient has side-rails up x 2 and bed alarm is in place. Problem: Risk for Impaired Skin Integrity  Goal: Tissue integrity - skin and mucous membranes  Structural intactness and normal physiological function of skin and  mucous membranes. Outcome: Ongoing  No new skin injury thus far this shift. Patient has blanchable redness on her buttocks. Patient refuses to turn as recommended. Patient stated that she is repositioning herself and refused pillow placement. Education provided regarding the benefits of repositioning and the risk to skin integrity associated with not repositioning as recommended. Problem: Pain:  Goal: Pain level will decrease  Pain level will decrease   Outcome: Ongoing  Pain Assessment: 0-10  Pain Level: 0   Pain goal:  No Pain  Is pain goal met at this time? Yes     Additional interventions to be implemented: position change    Problem: Discharge Planning:  Goal: Discharged to appropriate level of care  Discharged to appropriate level of care   Outcome: Ongoing  Patient plans to return home with daughter upon discharge. Problem: Nutrition  Goal: Optimal nutrition therapy  Outcome: Ongoing  Patient has excellent appetite. Patient needs continued education on fluid restriction. Comments: Care plan reviewed with patient. Patient verbalizes understanding of the plan of care and contribute to goal setting.

## 2018-07-10 NOTE — FLOWSHEET NOTE
07/09/18 2257   Provider Notification   Reason for Communication Evaluate   Provider Name Dr. Samantha Roca   Provider Notification Physician   Method of Communication Secure Message   Response Waiting for response   Notification Time [phone] New consult from Dr. Anais Preciado. Patient is having rectal bleeding this evening. She does report hemorrhoids and some \"rough BMs today\". Pt stool is light brown. We are going to be checking an fecal occult stool and trend H&Hs q 8. Pt is on eliquis and received a dose this evening at 8:45 PM. The eliquis is now dc'd. Thanks! Waiting for response. 2251- Provider read message. 230- Provider called back. Asked this RN to add patient to his list and he will see patient tomorrow.

## 2018-07-10 NOTE — PROGRESS NOTES
Patient currently resting quietly with eyes closed. Did not disturb. No family currently present. Please call if needs arise.

## 2018-07-10 NOTE — PROGRESS NOTES
Pt Name: Warren Desai  MRN: [id number]  [id number]  YOB: 1940  Admit Date: 7/5/2018  4:10 PM  Date of evaluation: 7/10/2018  Primary Care Physician: Tarsha Fajardo, LEMUEL - [facility]   [id number]   Dictating for Dr Seema Ellis    Initial consult 7-6-18 cirrhosis  Re-consulted 7-10-18 for blood dripping from rectum through night. Eliquis stopped    S  Pt denies and pain. Some mild rectal discomfort, but states it is from her inflamed hemorrhoids that intermittently flare. She had some some small red amount blood noted with BM's but stool is brown. Red blood also noted small amount in depends. O  /64   Pulse 84   Temp 97.7 °F (36.5 °C) (Oral)   Resp 18   Ht 5' 5\" (1.651 m)   Wt 219 lb 4.8 oz (99.5 kg)   SpO2 91%   BMI 36.49 kg/m²   VSS, afebrile  Alert and oriented  Resp: CTAB  Chest: RRR  Abd: soft, non-tender, distended, with active BS's  Ext: +bilateral lower ext edema and left arm. Rectal: Large grade 4 hemorrhoids with red rectal blood noted from irritated looking area.         Assessment:  · Hematochezia small amounts, intermittently. Stool is brown. Hmg stable. · Hemorrhoids, Grade 4, irritated and bleeding  · Liver Cirrhosis (believed to be secondary to FORD) ; initially had AMS. Negative SBP  · Anasarca  · UTI  · CHF, systolic with EF 05%  · Normocytic Anemia; hgb 9.8, stable  · DM2  · Chronic respiratory failure, on home O2 3 liter  · AF, on eliquis  · AICD/pacemaker   · CAD,Hx CABG; on Eliquis  · Portal HTN  · Cholelithiasis   · COPD  · Depression/anxiety   · Palliative comfort care per notes           Plan:  · Scheduled for weekly paracentesis with limit 4 liters.  Next one planned is 7-11-18  · If going with hospice, could have PleurX catheter (could be placed during weekdays when IR is here)  · Diuretics per Nephrology  · On dobutamine gtt  · Fluid restriction per renal 1500 ml   · On Lasix gtt  · Protonix IV BID, changed to oral  · On Rocephin   · Xifaxan 550 mg bid

## 2018-07-10 NOTE — PLAN OF CARE
Problem: Falls - Risk of:  Goal: Will remain free from falls  Will remain free from falls   Outcome: Ongoing  Patient cooperative with using walker and assist to ambulate. Steady on feet. Problem: Pain:  Goal: Pain level will decrease  Pain level will decrease   Outcome: Met This Shift      Problem: Discharge Planning:  Goal: Discharged to appropriate level of care  Discharged to appropriate level of care   Outcome: Ongoing  Planning home with daughter once stable. Problem: Nutrition  Goal: Optimal nutrition therapy  Outcome: Met This Shift      Comments: Care plan reviewed with patient. Patient verbalize understanding of the plan of care and contribute to goal setting.

## 2018-07-10 NOTE — PROGRESS NOTES
88   Temp 97.6 °F (36.4 °C) (Oral)   Resp 20   Ht 5' 5\" (1.651 m)   Wt 219 lb 4.8 oz (99.5 kg)   SpO2 92%   BMI 36.49 kg/m²   24HR INTAKE/OUTPUT:    Intake/Output Summary (Last 24 hours) at 07/10/18 0659  Last data filed at 07/10/18 0549   Gross per 24 hour   Intake          2251.98 ml   Output             2750 ml   Net          -498.02 ml     Admission weight: 227 lb (103 kg)  Wt Readings from Last 3 Encounters:   07/10/18 219 lb 4.8 oz (99.5 kg)   06/11/18 175 lb (79.4 kg)   06/10/18 168 lb (76.2 kg)     Body mass index is 36.49 kg/m². Physical examination    General:  Alert and cooperative with exam  HEENT:  Head: Normocephalic, no lesions, without obvious abnormality. Neck:   no adenopathy, no carotid bruit and no JVD  Lungs:  clear to auscultation bilaterally  Heart:  regular rate and rhythm, S1, S2 normal, no murmur, click, rub or gallop  Abdomen:  soft, non-tender; bowel sounds normal; no masses,  no organomegaly  Extremities:  3+ pitting edema  Neurologic:  Mental status: Alert, oriented, thought content appropriate  Psy:                 No evidence of depression. Mood is stable. Skin:                Warm and dry. No lesions or rashes noted. Muscles:         Hand  and leg strength are equal and strong bilaterally. Lab Data  CBC:   Recent Labs      07/09/18   2300   WBC  7.9   HGB  9.2*   PLT  156     BMP:  Recent Labs      07/07/18   1818  07/08/18   0714  07/08/18   1755  07/09/18   0555  07/10/18   0427   NA   --   137   --   137  134*   K  4.3  4.3  4.2  4.3  4.1   CL   --   101   --   102  97*   CO2   --   19*   --   22*  24   BUN   --   55*   --   59*  63*   CREATININE   --   1.5*   --   1.4*  1.5*   GLUCOSE   --   280*   --   237*  184*   CALCIUM   --   8.5   --   8.2*  8.2*   MG  2.0  2.0   --   2.1   --      TSH: No results for input(s): TSH in the last 72 hours. HgBa1c: No results for input(s): LABA1C in the last 72 hours.   Hepatic: No results for input(s): LABALBU,

## 2018-07-10 NOTE — PROGRESS NOTES
has been positive in the past.     Diagnostic imaging/procedures       Xr Acute Abd Series Chest 1 Vw    Result Date: 7/5/2018  PROCEDURE: XR ACUTE ABD SERIES CHEST 1 VW CLINICAL INFORMATION: History of cirrhosis and ascites, shortness of breath,  . COMPARISON: June 28, 2018 TECHNIQUE: A PA upright view of the chest was obtained. AP and supine views of the abdomen were obtained. FINDINGS: Chest: Permanent pacer. Median sternotomy wires. Cardiomegaly. Blunting of the right costophrenic angle. Possible opacity versus atelectasis in the left lung base. Prominent vasculature can also be a consideration. Degenerative changes thoracic spine and  both shoulders. Thoracic scoliosis. Abdomen: Nonspecific bowel gas pattern. Scattered air within bowel loops. No fluid level identified. Degenerative changes thoracolumbar spine with scoliosis. SI joints are symmetric. Osteopenia. Surgical clips in the right groin. Phleboliths in the pelvis. Probable atelectasis or prominent vessels in the lung bases. No discrete lobar consolidation. Possible small right pleural effusion versus blunting of the right costophrenic angle. Cardiomegaly. Nonspecific bowel gas pattern. **This report has been created using voice recognition software. It may contain minor errors which are inherent in voice recognition technology. ** Final report electronically signed by Dr. Alejandro Wagoner on 7/5/2018 5:58 PM    Xr Chest Portable    Result Date: 6/28/2018  PROCEDURE: XR CHEST PORTABLE CLINICAL INFORMATION: swelling lower extremity, . COMPARISON: Juany 10, 2018 TECHNIQUE: Single portable view chest FINDINGS: Permanent left chest wall pacer. Median sternotomy. Cardiomegaly. Prominence of the interstitium and hazy opacity in the lung bases. Correlate for interstitial edema and possible small amount of pleural fluid. No acute osseous findings. Prominence of the pulmonary vessels suggesting mild pulmonary vascular congestion.  Correlation advised Cardiomegaly. **This report has been created using voice recognition software. It may contain minor errors which are inherent in voice recognition technology. ** Final report electronically signed by Dr. Boone Hernandez on 6/28/2018 5:21 PM    Us Abdomen Limited    Result Date: 6/26/2018  PROCEDURE: US ABDOMEN LIMITED CLINICAL INFORMATION: Cirrhosis; ascites. COMPARISON: 6/19/2018. TECHNIQUE: Grayscale sonographic imaging of the 4 quadrants of the abdomen performed for localization of ascites. FINDINGS: 1. There is a small amount of ascites within the abdomen however this is insufficient quantity to safely perform an ultrasound-guided paracentesis. This was discussed with the patient who will return next week for her scheduled appointment. 1. There is a small amount of ascites within the abdomen however this is insufficient quantity to safely perform an ultrasound-guided paracentesis. This was discussed with the patient who will return next week for her scheduled appointment. **This report has been created using voice recognition software. It may contain minor errors which are inherent in voice recognition technology. ** Final report electronically signed by Dr. Angie Segovia on 6/26/2018 12:22 PM    Us Guided Paracentesis    Result Date: 7/3/2018  PROCEDURE: US GUIDED PARACENTESIS CLINICAL INFORMATION: Cirrhosis; ascites. COMPARISON: 6/19/2018. PHYSICIAN PERFORMING PROCEDURE: Mila Ramirez M.D. PROCEDURE:  Informed consent was obtained. Limited sonographic imaging of the abdomen was performed for localization of ascites. The skin along the lateral margin of the right upper abdomen was marked over a pocket of ascites. The puncture site was prepped and draped in a sterile fashion and locally anesthetized with 2% lidocaine. The distal tip of a 5 Turkish one-step catheter was advanced into the ascites.  An 80 ml specimen was obtained to be sent to the laboratory for analysis as per the orders of the referring An 80 mL specimen was obtained to be sent to the laboratory for analysis as per the orders of the referring caregiver. **This report has been created using voice recognition software. It may contain minor errors which are inherent in voice recognition technology. ** Final report electronically signed by Dr. Gregoria Cutler on 6/19/2018 1:27 PM            ASSESSMENT     1. Anasarca secondary to a combination of acute on chronic systolic congestive heart failure and cirrhosis of the liver with ascites and right sided pleural effusion, recalcitrant to oral diuretics. 2. Chronic kidney disease stage 3.  3. Klebsiella ozaena cystitis. 4. Hemorrhoidal bleed. 5. Chronic co-morbidities:  1. Chronic kidney disease Stage 3  2. Paroxysymal atrial fibrillation on anticoagulation  3. Essential hypertension  4. Hypothyroidism  5. Positive VIRA  6. History of CVA  7. Diabetes mellitus Type 2      PLAN     1. Continue current management. Will reassess in am. Patient will need transition to oral diuretics prior to discharge to ensure that she continues to diurese while on oral diuretics. 2. VIRA positive in the past, AMA negative. Is there a possibility that the patient's cirrhosis is rheumatological in nature? 3. Patient is quite open to Palliative Care consult. Her birthday is just around the corner though and she has a big event planned for it. 4. Continue Ceftriaxone for total of 7 days. 5. Paracentesis tomorrow. 6. Eliquis held for procedure. 7. Hemoglobin stable. FFPS if signs of acute blood loss anemia. DVT prophylaxis: [x] Lovenox                                 [x] SCDs                                 [] SQ Heparin                                 [] Encourage ambulation           [] Already on Anticoagulation     Disposition:    [] Home       [] TCU       [] Rehab       [] Psych       [] SNF       [x] [facility]       [] Other-    Anticipated Discharge in : 2 to 3 days.     Code Status:

## 2018-07-10 NOTE — CARE COORDINATION
7/10/18  2:35 PM  Hospitalist, GI, Cardiology, Nephrology following. EF 15%. Patient has ICD/pacer. Paracentesis planned today.

## 2018-07-11 NOTE — PLAN OF CARE
Problem: Falls - Risk of:  Goal: Will remain free from falls  Will remain free from falls   Outcome: Ongoing  No falls this shift. Patient is able to use call light. Has not set bed/chair alarm off this shift. She is oriented. Problem: Risk for Impaired Skin Integrity  Goal: Tissue integrity - skin and mucous membranes  Structural intactness and normal physiological function of skin and  mucous membranes. Outcome: Ongoing  No new skin integrity issues. Patient is able to turn and reposition herself but needs encouragement. She is having loose stools today from lactulose so she is at risk for skin breakdown. Problem: Pain:  Goal: Pain level will decrease  Pain level will decrease   Outcome: Ongoing  Pain Assessment: 0-10  Pain Level: 0   Pain goal:  no pain  Is pain goal met at this time? Yes     Additional interventions to be implemented: position change and rest      Problem: Discharge Planning:  Goal: Discharged to appropriate level of care  Discharged to appropriate level of care   Outcome: Ongoing  Patient is from home with daughter and she plans to return there at time of discharge. Problem: Nutrition  Goal: Optimal nutrition therapy  Outcome: Ongoing  Patient on carb control diet. Her appetite is good. No complaints of n/v this shift. Comments: Care plan reviewed with patient and daughter. Patient and daughter verbalize understanding of the plan of care and contribute to goal setting.

## 2018-07-11 NOTE — PROGRESS NOTES
in the lung bases. No discrete lobar consolidation. Possible small right pleural effusion versus blunting of the right costophrenic angle. Cardiomegaly. Nonspecific bowel gas pattern. **This report has been created using voice recognition software. It may contain minor errors which are inherent in voice recognition technology. ** Final report electronically signed by Dr. Michelle Boyle on 7/5/2018 5:58 PM    Xr Chest Portable    Result Date: 6/28/2018  PROCEDURE: XR CHEST PORTABLE CLINICAL INFORMATION: swelling lower extremity, . COMPARISON: Juany 10, 2018 TECHNIQUE: Single portable view chest FINDINGS: Permanent left chest wall pacer. Median sternotomy. Cardiomegaly. Prominence of the interstitium and hazy opacity in the lung bases. Correlate for interstitial edema and possible small amount of pleural fluid. No acute osseous findings. Prominence of the pulmonary vessels suggesting mild pulmonary vascular congestion. Correlation advised Cardiomegaly. **This report has been created using voice recognition software. It may contain minor errors which are inherent in voice recognition technology. ** Final report electronically signed by Dr. Michelle Boyle on 6/28/2018 5:21 PM    Us Abdomen Limited    Result Date: 6/26/2018  PROCEDURE: US ABDOMEN LIMITED CLINICAL INFORMATION: Cirrhosis; ascites. COMPARISON: 6/19/2018. TECHNIQUE: Grayscale sonographic imaging of the 4 quadrants of the abdomen performed for localization of ascites. FINDINGS: 1. There is a small amount of ascites within the abdomen however this is insufficient quantity to safely perform an ultrasound-guided paracentesis. This was discussed with the patient who will return next week for her scheduled appointment. 1. There is a small amount of ascites within the abdomen however this is insufficient quantity to safely perform an ultrasound-guided paracentesis. This was discussed with the patient who will return next week for her scheduled appointment.  **This report has been created using voice recognition software. It may contain minor errors which are inherent in voice recognition technology. ** Final report electronically signed by Dr. Haylie Jeffrey on 6/26/2018 12:22 PM    Us Guided Paracentesis    Result Date: 7/3/2018  PROCEDURE: US GUIDED PARACENTESIS CLINICAL INFORMATION: Cirrhosis; ascites. COMPARISON: 6/19/2018. PHYSICIAN PERFORMING PROCEDURE: Queen Saranya BONILLA PROCEDURE:  Informed consent was obtained. Limited sonographic imaging of the abdomen was performed for localization of ascites. The skin along the lateral margin of the right upper abdomen was marked over a pocket of ascites. The puncture site was prepped and draped in a sterile fashion and locally anesthetized with 2% lidocaine. The distal tip of a 5 [place] one-step catheter was advanced into the ascites. An 80 ml specimen was obtained to be sent to the laboratory for analysis as per the orders of the referring physician. .  A total of 1.4 L clear yellow ascites was then aspirated and discarded. The 5 [place] one-step catheter was then removed. The patient tolerated the procedure well and and there were no immediate complications. 1.  Uncomplicated diagnostic and therapeutic paracentesis. **This report has been created using voice recognition software. It may contain minor errors which are inherent in voice recognition technology. ** Final report electronically signed by Dr. Haylie Jeffrey on 7/3/2018 3:02 PM    Us Guided Paracentesis    Result Date: 6/19/2018  PROCEDURE: US GUIDED PARACENTESIS CLINICAL INFORMATION: Ascites. COMPARISON: 6/10/2018. PHYSICIAN PERFORMING PROCEDURE: Queen Saranya BONILLA PROCEDURE:  Informed consent was obtained. Limited sonographic imaging of the abdomen was performed for localization of ascites. The skin along the lateral margin of the right upper abdomen was marked over a pocket of ascites.   The puncture site was prepped and draped in a sterile fashion and locally anesthetized with 2% lidocaine. The distal tip of a 5 Albanian one-step catheter was advanced into the ascites. An 80 ml specimen was obtained to be sent to the laboratory for analysis as per the orders of the referring caregiver. Only 50 mL of clear yellow ascites could be aspirated. The 5 [place]ian one-step catheter was then removed. The skin along the lateral margin of the right lower abdomen was then marked over a pocket of ascites. The puncture site was prepped and draped in a sterile fashion and locally anesthetized with 2% lidocaine. The distal tip of a 5 Albanian one-step catheter was advanced into the ascites. An additional 2.45 L of clear yellow ascites was aspirated. The 5 Albanian one-step catheter was then removed. The patient tolerated the procedure well and and there were no immediate complications. 1.  Uncomplicated diagnostic and therapeutic paracentesis. 2. A total of 2.5 L of clear yellow ascites was aspirated. 3. An 80 mL specimen was obtained to be sent to the laboratory for analysis as per the orders of the referring caregiver. **This report has been created using voice recognition software. It may contain minor errors which are inherent in voice recognition technology. ** Final report electronically signed by Dr. Pallavi Gong on 6/19/2018 1:27 PM            ASSESSMENT     1. Anasarca secondary to a combination of acute on chronic systolic congestive heart failure and cirrhosis of the liver with ascites and right sided pleural effusion, recalcitrant to oral diuretics s/p paracentesis with removal of 1.76 liters of fluid on 07/11/2018. 2. Chronic kidney disease stage 3.  3. Klebsiella ozaena cystitis. 4. Hemorrhoidal bleed. 5. Skin Irritation at left upper extremity likely bruising, doubt infection as patient currently on Ceftriaxone. 6. Abdominal pruritus  7. Restless legs syndrome  8. Chronic co-morbidities:  1. Chronic kidney disease Stage 3  2.  Paroxysymal atrial fibrillation on

## 2018-07-11 NOTE — PLAN OF CARE
Problem: Falls - Risk of:  Goal: Will remain free from falls  Will remain free from falls   Outcome: Met This Shift  Up with one assist and walker, no falls this shift    Problem: Risk for Impaired Skin Integrity  Goal: Tissue integrity - skin and mucous membranes  Structural intactness and normal physiological function of skin and  mucous membranes. Outcome: Met This Shift  Repositioning q2 hours  No new skin breakdown this shift    Problem: Pain:  Goal: Pain level will decrease  Pain level will decrease   Outcome: Met This Shift  Pain Assessment: 0-10  Pain Level: 0   Pain goal:  0  Is pain goal met at this time? Yes     Additional interventions to be implemented: none      Problem: Discharge Planning:  Goal: Discharged to appropriate level of care  Discharged to appropriate level of care   Outcome: Ongoing  Discharge pending physician approval        Comments: Care plan reviewed with patient. Patient verbalizes understanding of the plan of care and contributes to goal setting.

## 2018-07-11 NOTE — FLOWSHEET NOTE
07/11/18 1445   Encounter Summary   Services provided to: Patient   Referral/Consult From: [address] of [address] Visiting Yes  (7/11)   Complexity of Encounter Moderate   Length of Encounter 30 minutes   Spiritual/Adventism   Type Spiritual support   Assessment Approachable   Intervention Active listening;Explored feelings, thoughts, concerns;Explored coping resources;Nurtured hope;Prayer   Outcome Comfort;Expressed gratitude;Encouraged; Hopeful   Subjective:  Katerin Pino is a 76year old female. She was in bed when I entered the room    Objective:  Pt talked about her not being able to eat or drink and the fluid that has built in her body. She spoke of her swelling having gone down and the many tests she is taking to figure out what is the matter. Pt was watching a TV show on food preporation. Assessment:  See so enjoyed the spiritual care contact and would have talked a long time. I offered prayer before I left. Plan:  Follow up is needed for spiritual and emotional support of both pt and her daughter Alvarado Files). Spiritual care card with Will 23, prayer or Prayer for peace was given. It has our information of service, hours and phone number on it.

## 2018-07-11 NOTE — PROGRESS NOTES
ALT  9*   BILITOT  0.5   ALKPHOS  136*     Troponin: No results for input(s): TROPONINI in the last 72 hours. BNP: No results for input(s): BNP in the last 72 hours. Lipids: No results for input(s): CHOL, HDL in the last 72 hours. Invalid input(s): LDLCALCU  INR:   Recent Labs      07/10/18   0427  07/11/18   0524   INR  1.42*  1.25*            Assessment:    1. Acute kidney injury  2. Acute CHF  3. Anasarca-improving. 4. Fluid overloaded  5. CKD stage IIIB from DM and HTN  6. DM  7. CAD  8. A-fib     Plan:    1. Will continue dobutrex until 07/12/18 and increase the dose  2. Will continue lasix drip until 07/12/18       Ileana Conner. DO [facility].

## 2018-07-11 NOTE — PROGRESS NOTES
Pt Name: Marisa Guadalupe  MRN: [id number]  [id number]  YOB: 1940  Admit Date: 7/5/2018  4:10 PM  Date of evaluation: 7/11/2018  Primary Care Physician: Tiana Barclay, LEMUEL - CNP   [id number]   Dictating for Dr Claudia Lizarraga denies abominal pain. Dhe feels hemorrhoids are lightly smaller and no longer has any rectal discomfort. Sitting in chair. Discussed importance of keeping stools soft without straining. Staff reported she had a harder stool a few days ago. O  /61   Pulse 93   Temp 96.7 °F (35.9 °C) (Oral)   Resp 18   Ht 5' 5\" (1.651 m)   Wt 217 lb 5 oz (98.6 kg)   SpO2 97%   BMI 36.16 kg/m²   VSS, afebrile  Alert and oriented  Resp: CTAB  Chest: RRR  Abd: soft, non-tender, distended, with active BS's  Ext: +bilateral lower ext edema and left arm. Rectal: Large grade 4 hemorrhoids with red rectal blood noted from irritated looking area.         Assessment:  · Hematochezia small amounts, intermittently. Stool is brown. Hmg stable 9.5. · Hemorrhoids, Grade 4, irritated and bleeding. Small amount red blood last evening but pt reports improving. · Liver Cirrhosis (believed to be secondary to FORD) ; initially had AMS. Negative SBP  · Anasarca  · UTI  · CHF, systolic with EF 82%  · Normocytic Anemia; hgb 9.8, stable  · DM2  · Chronic respiratory failure, on home O2 3 liter  · AF, on eliquis  · AICD/pacemaker   · CAD,Hx CABG; on Eliquis  · Portal HTN  · Cholelithiasis   · COPD  · Depression/anxiety   · Palliative comfort care per notes           Plan:  · Scheduled for weekly paracentesis with limit 4 liters. Next one planned is 7-11-18 today.   · If going with hospice, could have PleurX catheter (could be placed during weekdays when IR is here)  · Diuretics per Nephrology  · On dobutamine gtt  · Fluid restriction per renal 1500 ml   · On Lasix gtt  · Protonix BID, oral  · On Rocephin    · Xifaxan 550 mg bid, continue at discharge  · Palliative care note reviewed; pt does not

## 2018-07-12 NOTE — PROGRESS NOTES
[facility]   [facility] [address]   [address] [facility], [facility]   [phone]   Nephrology Hospital progress note       7/12/2018, 11:58 AM        Pt Name:    Shivam Yepez  MRN:     [id number]     YOB: 1940  Admit Date:    7/5/2018  4:10 PM  Primary Care Physician:  LEMUEL Herman - CNP   Primary Nephrologist:          Dr. Agusto Granados number  [id number]    ISOLATION: yes     Admitting Chief Complaint:   Shortness of breath     History of Chief Complaint:   The patient is a 68-year-old   female with past medical history as stated below presented with chief  complaint of swelling predominantly on the bilateral lower extremities  which has been started almost one week ago, gradually getting worse. Symptoms very severe. The patient states that she has almost gained around  50 pounds. The patient states she has been eating and drinking very well. She is hungry. She also has some associated orthopnea, chills and some  chest pressure. No other issues. She has been having on and off diarrhea. The patient is also diagnosed with liver cirrhosis a few months ago and has  had around 1.5 liter paracentesis about two days ago. She is currently not  in any acute distress. Nephrology is treating:   Acute kidney injury and fluid overload     Subjective: The patient was relaxing in the bedside chair eating lunch. She feels improved. Her usual weigh is 170 pounds. She was admitted at 227 pounds and is now at 212 pounds. Her kidney function remains poor but stable. Increasing the dobutrex dose has helped with urine output.      Lab Results   Component Value Date    LABGLOM 29 07/12/2018    LABGLOM 33 07/11/2018    LABGLOM 33 07/10/2018    LABGLOM 36 07/09/2018    LABGLOM 33 07/08/2018    LABGLOM 39 07/07/2018          Baseline eGFR > 60 ml/min   Admit eGFR 38 ml/min   Current eGFR 33 ml/min         Objective:    Diet: renal/CHF    VITALS:  /67   Pulse 90 0. 5   ALKPHOS  136*     Troponin: No results for input(s): TROPONINI in the last 72 hours. BNP: No results for input(s): BNP in the last 72 hours. Lipids: No results for input(s): CHOL, HDL in the last 72 hours. Invalid input(s): LDLCALCU  INR:   Recent Labs      07/10/18   0427  07/11/18   0524  07/12/18   0559   INR  1.42*  1.25*  1.36*            Assessment:    1. Acute kidney injury  2. Acute CHF  3. Anasarca-improving. 4. Fluid overloaded  5. CKD stage IIIB from DM and HTN  6. DM  7. CAD  8. A-fib     Plan:    1. Will continue dobutrex until 07/13/18  2. Will continue lasix drip until 07/13/18       Jose Segura. DO [facility].

## 2018-07-12 NOTE — PLAN OF CARE
Problem: Falls - Risk of:  Goal: Will remain free from falls  Will remain free from falls   Outcome: Met This Shift  Pt is free from falls during this shift, uses a front wheeled walker    Problem: Risk for Impaired Skin Integrity  Goal: Tissue integrity - skin and mucous membranes  Structural intactness and normal physiological function of skin and  mucous membranes.    Outcome: Met This Shift      Problem: Pain:  Goal: Pain level will decrease  Pain level will decrease   Outcome: Met This Shift      Problem: Discharge Planning:  Goal: Discharged to appropriate level of care  Discharged to appropriate level of care   Outcome: Ongoing  Pt plans to go home with daughter at discharge    Problem: Nutrition  Goal: Optimal nutrition therapy  Outcome: Met This Shift  Pt is on a 1500 ml fluid restriction

## 2018-07-13 NOTE — PLAN OF CARE
Problem: Falls - Risk of:  Goal: Will remain free from falls  Will remain free from falls   Outcome: Met This Shift  Patient ambulatory and free from falls this shift. Problem: Risk for Impaired Skin Integrity  Goal: Tissue integrity - skin and mucous membranes  Structural intactness and normal physiological function of skin and  mucous membranes. Outcome: Ongoing  Skin assessment every four hours and whenever in the room. Problem: Pain:  Goal: Pain level will decrease  Pain level will decrease   Outcome: Met This Shift  Patient denied pain this shift. Turns and repositions self in bed. Problem: Discharge Planning:  Goal: Discharged to appropriate level of care  Discharged to appropriate level of care   Outcome: Ongoing  Discharge needs addressed as care evolves. Problem: Nutrition  Goal: Optimal nutrition therapy  Outcome: Ongoing  Patient appetite improving as patient begins to feel closer to her normal.    Problem: Physical Regulation:  Goal: Prevent transmision of infection  Prevent transmision of infection   Outcome: Ongoing  On contact precautions to protect self and others. Comments: Care plan reviewed with patient and daughter. Patient and daughter verbalize understanding of the plan of care and contribute to goal setting.

## 2018-07-13 NOTE — PROGRESS NOTES
97%   BMI 35.69 kg/m²   24HR INTAKE/OUTPUT:      Intake/Output Summary (Last 24 hours) at 07/13/18 1136  Last data filed at 07/13/18 0327   Gross per 24 hour   Intake              835 ml   Output             1525 ml   Net             -690 ml     Admission weight: 227 lb (103 kg)  Wt Readings from Last 3 Encounters:   07/13/18 214 lb 8 oz (97.3 kg)   06/11/18 175 lb (79.4 kg)   06/10/18 168 lb (76.2 kg)     Body mass index is 35.69 kg/m². Physical examination    General:  Alert and appropriate. HEENT:  Head: Normocephalic, no lesions, without obvious abnormality. Neck:   no adenopathy, no carotid bruit and no JVD  Lungs:  clear to auscultation bilaterally  Heart:  regular rate and rhythm, S1, S2 normal, no murmur, click, rub or gallop  Abdomen:  soft, non-tender; bowel sounds normal; no masses,  no organomegaly  Extremities:  3+ pitting edema  Neurologic:  Mental status: Alert, oriented, thought content appropriate  Psy:                 No evidence of depression. Mood is stable. Skin:                Warm and dry. No lesions or rashes noted. Muscles:         Hand  and leg strength are equal and strong bilaterally. Lab Data  CBC:   Recent Labs      07/10/18   1642  07/11/18   0524   WBC   --   8.1   HGB  9.8*  9.5*   PLT   --   146     BMP:  Recent Labs      07/11/18   0524  07/12/18   0559  07/13/18   0525   NA  134*  137  132*   K  3.7  3.5  3.4*   CL  95*  95*  92*   CO2  25  27  26   BUN  65*  72*  78*   CREATININE  1.5*  1.7*  1.8*   GLUCOSE  184*  175*  133*   CALCIUM  8.4*  8.5  8.6   MG   --   2.4   --      TSH: No results for input(s): TSH in the last 72 hours. HgBa1c: No results for input(s): LABA1C in the last 72 hours. Hepatic:   Recent Labs      07/11/18   0524   LABALBU  2.8*   AST  16   ALT  9*   BILITOT  0.5   ALKPHOS  136*     Troponin: No results for input(s): TROPONINI in the last 72 hours. BNP: No results for input(s): BNP in the last 72 hours.   Lipids: No results for input(s): CHOL, HDL in the last 72 hours. Invalid input(s): LDLCALCU  INR:   Recent Labs      07/11/18   0524  07/12/18   0559  07/13/18   0531   INR  1.25*  1.36*  1.35*            Assessment:    1. Acute kidney injury  2. Acute CHF  3. Anasarca-improving. 4. Fluid overloaded  5. CKD stage IIIB from DM and HTN  6. DM  7. CAD  8. A-fib     Plan:    1. It is OK with nephrology for the primary physician to discharge the patient when ready. Follow up visit with Dr. Greer Pena in 14-21 days with CBC and BMP drawn 48 hours prior to the visit. Li Decker, DO  [facility].

## 2018-07-13 NOTE — CARE COORDINATION
Nephrology has signed off with follow up labs and appointment instructions.  IV Dobutamine and IV Lasix discontinued, Lasix changed to oral.  Electronically signed by Hilda Red RN on 7/13/2018 at 2:11 PM

## 2018-07-13 NOTE — PROGRESS NOTES
oriented, thought content appropriate, normal insight  Vascular: Peripheral edema improving at the bilateral lower extremities. Dependent edema at the bilateral upper extremities also improving. Induration at area of left upper extremity improved. Erythema still present at left upper extremity at elbow but continues to improve. Pain on palpation. Skin: Rash at elbow appears to be improving. Labs     Recent Labs      07/10/18   1642  07/11/18   0524   WBC   --   8.1   HGB  9.8*  9.5*   HCT  28.7*  30.2*   PLT   --   146     Recent Labs      07/11/18   0524  07/12/18   0559  07/13/18   0525   NA  134*  137  132*   K  3.7  3.5  3.4*   CL  95*  95*  92*   CO2  25  27  26   BUN  65*  72*  78*   CREATININE  1.5*  1.7*  1.8*   CALCIUM  8.4*  8.5  8.6     Recent Labs      07/11/18   0524   AST  16   ALT  9*   BILITOT  0.5   ALKPHOS  136*     Recent Labs      07/11/18   0524  07/12/18   0559  07/13/18   0531   INR  1.25*  1.36*  1.35*     Urinalysis:      Lab Results   Component Value Date    NITRU NEGATIVE 07/05/2018    WBCUA > 100 07/05/2018    BACTERIA MANY 07/05/2018    RBCUA 0-2 07/05/2018    BLOODU NEGATIVE 07/05/2018    GLUCOSEU NEGATIVE 07/05/2018       Labs reviewed and VIRA has been positive in the past.     Diagnostic imaging/procedures       Xr Acute Abd Series Chest 1 Vw    Result Date: 7/5/2018  PROCEDURE: XR ACUTE ABD SERIES CHEST 1 VW CLINICAL INFORMATION: History of cirrhosis and ascites, shortness of breath,  . COMPARISON: June 28, 2018 TECHNIQUE: A PA upright view of the chest was obtained. AP and supine views of the abdomen were obtained. FINDINGS: Chest: Permanent pacer. Median sternotomy wires. Cardiomegaly. Blunting of the right costophrenic angle. Possible opacity versus atelectasis in the left lung base. Prominent vasculature can also be a consideration. Degenerative changes thoracic spine and  both shoulders. Thoracic scoliosis. Abdomen: Nonspecific bowel gas pattern.  Scattered air within imaging of the abdomen was performed for localization of ascites. The skin along the lateral margin of the right upper abdomen was marked over a pocket of ascites. The puncture site was prepped and draped in a sterile fashion and locally anesthetized with 2% lidocaine. The distal tip of a 5 Mauritian one-step catheter was advanced into the ascites. An 80 ml specimen was obtained to be sent to the laboratory for analysis as per the orders of the referring caregiver. Only 50 mL of clear yellow ascites could be aspirated. The 5 Mauritian one-step catheter was then removed. The skin along the lateral margin of the right lower abdomen was then marked over a pocket of ascites. The puncture site was prepped and draped in a sterile fashion and locally anesthetized with 2% lidocaine. The distal tip of a 5 Maur[place] one-step catheter was advanced into the ascites. An additional 2.45 L of clear yellow ascites was aspirated. The 5 Mauritian one-step catheter was then removed. The patient tolerated the procedure well and and there were no immediate complications. 1.  Uncomplicated diagnostic and therapeutic paracentesis. 2. A total of 2.5 L of clear yellow ascites was aspirated. 3. An 80 mL specimen was obtained to be sent to the laboratory for analysis as per the orders of the referring caregiver. **This report has been created using voice recognition software. It may contain minor errors which are inherent in voice recognition technology. ** Final report electronically signed by Dr. Sandro Valle on 6/19/2018 1:27 PM            ASSESSMENT     1. Anasarca secondary to a combination of acute on chronic systolic congestive heart failure and cirrhosis of the liver with ascites and right sided pleural effusion, recalcitrant to oral diuretics s/p paracentesis with removal of 1.76 liters of fluid on 07/11/2018. 2. Chronic kidney disease stage 3.  3. Klebsiella ozaena cystitis. 4. Hemorrhoidal bleed.   5. Skin Irritation at left upper extremity likely bruising, doubt infection as patient currently on Ceftriaxone. 6. Abdominal pruritus  7. Hyponatremia  8. Hypokalemia  9. Restless legs syndrome  10. Chronic co-morbidities:  1. Chronic kidney disease Stage 3  2. Paroxysymal atrial fibrillation on anticoagulation  3. Essential hypertension  4. Hypothyroidism  5. Positive VIRA  6. History of CVA  7. Diabetes mellitus Type 2      PLAN     1. Patient gained weight with the transition to intermittent intravenous Lasix however she is still hypotensive at times. Will give albumin with Lasix. 2. Replace potassium as necessary. 3. VIRA positive in the past, AMA negative. Anti-citrullinated peptide antibody unremarkable. 4. Patient is quite open to Palliative Care consult. Her birthday is just around the corner though and she has a big event planned for it. 5. Continue Ceftriaxone for total of 7 days. Last dose today  6. Start requip for restless legs syndrome. 7. Used benadryl for itching and patient's restless legs became worse so will try atarax for itching instead. 8. Hemoglobin stable. FFPS if signs of acute blood loss anemia. 9. Will rescan abdomen if patient continues to experience abdominal pain      DVT prophylaxis: [x] Lovenox                                 [x] SCDs                                 [] SQ Heparin                                 [] Encourage ambulation           [] Already on Anticoagulation     Disposition:    [] Home       [] TCU       [] Rehab       [] Psych       [] SNF       [x] [place]       [] Other-    Anticipated Discharge in: 2 to 3 days.     Code Status: Limited    Electronically signed by Salas Cleveland MD on 7/13/2018 at 4:17 PM

## 2018-07-13 NOTE — PROGRESS NOTES
Nutrition Assessment    Type and Reason for Visit: Reassess    Nutrition Recommendations: Continue current diet, ONS, MVI. Malnutrition Assessment:  · Malnutrition Status: No malnutrition    Nutrition Diagnosis:   · Problem: Inadequate oral intake  · Etiology: related to Insufficient energy/nutrient consumption     Signs and symptoms:  as evidenced by Diet history of poor intake    Nutrition Assessment:  · Subjective Assessment: Pt. seen. Reports fair appetite. Reports \"loves\" Glucerna ONS. Denies any nausea or abdominal pain. Pt. denies any questions on diet. Reports understanding. 7/13: BUN 78, Cr 1.8, Potassium 3.4. Rx includes MVI, Lasix, Lactulose. S/p paracentesis with 1.76 L removed (7/11)  · Wound Type: None  · Current Nutrition Therapies:  · Oral Diet Orders: Carb Control 4 Carbs/Meal, Cardiac, Dental Soft, Fluid Restriction   · Oral Diet intake: 51-75%, %  · Oral Nutrition Supplement (ONS) Orders: Diabetic Oral Supplement (BID)  · ONS intake: %  · Anthropometric Measures:  · Ht: 5' 5\" (165.1 cm)   · Current Body Wt: 214 lb 8 oz (97.3 kg) (7/13, +1, +2 edema)  · Admission Body Wt: 226 lb (102.5 kg)  · Usual Body Wt:  (Per EMR, pt weighed 175# on 6/11/18; pt weighed 168#6. 4oz on 5/22/18)  · % Weight Change: 50# wt gain since 6/11/18 (3 weeks) due to edema,     · Ideal Body Wt: 125 lb (56.7 kg),   · BMI Classification: BMI 35.0 - 39.9 Obese Class II  · Comparative Standards (Estimated Nutrition Needs):  · Estimated Daily Total Kcal: 1700 kcals/day  · Estimated Daily Protein (g): 46-62 gm (monitor renal function)    Estimated Intake vs Estimated Needs: Intake Improving    Nutrition Risk Level:  Moderate    Nutrition Interventions:   Continue current diet, Continue current ONS  Continued Inpatient Monitoring, Education Initiated (7/9 Encouraged compliance with low sodium diet, fluid restriction)    Nutrition Evaluation:   · Evaluation: Progressing toward goals   · Goals: Pt will

## 2018-07-14 NOTE — PROGRESS NOTES
Renal Progress Note    Assessment and Plan: 1. Acute kidney injury from diuretic   2. Volume overload  3. Severe cardiomyopathy with low Ef  4. Severe tricuspid valve regurgitation  5. Moderarte to severe mitral valve stenosis   PLAN:  Reviewed labs   Reviewed medications   Decrease carvedilol to 12.5 mg po bid as BP is on the low end of normal  Metolazone 5 mg po once   AM labs   Discussed with patient   Will follow     Patient Active Problem List:     Anasarca     Acquired hypothyroidism     Cardiomyopathy ([address]     Essential hypertension     Diabetes mellitus type 2 in obese (HCC)     Anemia, normocytic normochromic     Acute systolic congestive heart failure (HCC)     PAD (peripheral artery disease) (HCC)     COPD (chronic obstructive pulmonary disease) (HCC)     Chronic respiratory failure with hypoxia and hypercapnia (HCC)     Acute kidney injury ([address]     Chronic kidney disease, stage 3     Coronary artery disease involving native coronary artery of native heart without angina pectoris     Thrombocytopenia (HCC)     Physical deconditioning     Other ascites     Cirrhosis of liver with ascites (HCC)     Pleural effusion     Urinary retention     Altered mental status     Moderate protein-calorie malnutrition (HCC)     Acute hepatic encephalopathy     Left ventricular systolic dysfunction     Hyponatremia     Alkalosis, metabolic     Hyperkalemia     Hypervolemia due to congestive heart failure ([address]     CKD stage 3 due to type 2 diabetes mellitus ([address]     Coronary artery disease involving coronary bypass graft of native heart without angina pectoris     Paroxysmal atrial fibrillation (HCC)     UTI due to Klebsiella species     Positive VIRA (antinuclear antibody)     History of CVA (cerebrovascular accident)     Bleeding hemorrhoid     Pruritus     Skin irritation     Restless legs syndrome      Subjective:   Admit Date: 7/5/2018    Interval History:   Seeing for acute kidney injury   Awake and alert   Has no BMI 36.24 kg/m²    Wt Readings from Last 3 Encounters:   07/14/18 217 lb 12.8 oz (98.8 kg)   06/11/18 175 lb (79.4 kg)   06/10/18 168 lb (76.2 kg)      24HR INTAKE/OUTPUT:    Intake/Output Summary (Last 24 hours) at 07/14/18 1039  Last data filed at 07/14/18 0329   Gross per 24 hour   Intake              831 ml   Output             2000 ml   Net            -1169 ml       Constitutional:  Alert, awake, no apparent distress  Skin:normal   HEENT:Pupils are reactive . Throat is clear   Neck:supple with no thyromegaly  Cardiovascular:  S1, S2 without murmur  Respiratory:  Bilateral crackles   Abdomen: +bs, soft, non tender  Ext: +++ LE edema  Musculoskeletal:Intact  Neuro:Alert ,awake and oriented with no deficit      Electronically signed by Shira Escamilla MD on 7/14/2018 at 10:39 AM

## 2018-07-14 NOTE — PLAN OF CARE
Problem: Falls - Risk of:  Goal: Will remain free from falls  Will remain free from falls   Outcome: Met This Shift  Bed in lowest position, call light in reach, side rails up x 2, non-skid slippers on, bed/chair alarm active. No falls during shift, at this time. Patient ambulates with 1 assist, gait belt, and a walker. Problem: Risk for Impaired Skin Integrity  Goal: Tissue integrity - skin and mucous membranes  Structural intactness and normal physiological function of skin and  mucous membranes. Outcome: Ongoing  Patient has some minor skin issues - see flowsheet for details. Patient moves freely in bed and is encouraged to change positions every 2 hours and as needed. Problem: Pain:  Goal: Pain level will decrease  Pain level will decrease   Outcome: Met This Shift  Patient complains of no pain at this time. Pain Assessment: 0-10  Pain Level: 0   Pain goal:  0  Is pain goal met at this time? Yes  Pain Intervention(s): MD notified (comment)  Additional interventions to be implemented: position change and rest      Problem: Discharge Planning:  Goal: Discharged to appropriate level of care  Discharged to appropriate level of care   Outcome: Ongoing  Not yet ready for discharge, plan to discharge back home with daughter when ready. Problem: Physical Regulation:  Goal: Prevent transmision of infection  Prevent transmision of infection   Outcome: Ongoing  Patient was admitted with HX VRE - in contact precautions. Comments: Care plan reviewed with patient. Patient verbalize understanding of the plan of care and contribute to goal setting.

## 2018-07-14 NOTE — PROGRESS NOTES
Hospitalist Progress Note    Patient:  Abel Saunders  YOB: 1940  MRN: [id number]   PCP: LEMUEL Segura CNP         Acct: [de-identified]  [facility]    Date of Admission: 7/5/2018      Chief Complaint     Anasarca    SUBJECTIVE     The patient is a 76 y.o. female who was admitted for anasarca. Still having an achy right abdomen with associated pruritus. No further lightheadedness with standing. Per Dr. Priya Clifton, carvedilol decreased to 12.5 mg bid and metolazone added po        OBJECTIVE     Medications:  Reviewed      Ins and outs:      Intake/Output Summary (Last 24 hours) at 07/14/18 1404  Last data filed at 07/14/18 0329   Gross per 24 hour   Intake              291 ml   Output              900 ml   Net             -609 ml       Physical Examination     BP (!) 110/55   Pulse 80   Temp 98.6 °F (37 °C) (Oral)   Resp 20   Ht 5' 5\" (1.651 m)   Wt 217 lb 12.8 oz (98.8 kg)   SpO2 95%   BMI 36.24 kg/m²     General appearance: No apparent distress, appears stated age and cooperative. Sitting up in chair. HEENT: Extraocular motion intact. Neck: Supple, with full range of motion. No jugular venous distention. Trachea midline. Respiratory:  Normal respiratory effort. Clear to auscultation, bilaterally without Rales/Wheezes/Rhonchi. Cardiovascular: Regular rate and rhythm with normal S1/S2 without murmurs, rubs or gallops. Abdomen: Soft, + fluid wave, non-distended with normal bowel sounds. No obvious evidence of rash at abdominal area. Subcutaneous edema at abdominal area where patient mentions itching. No evidence for rash. + increase in abdominal distention. Musculoskeletal: passive and active ROM x 4 extremities. Neurologic:  No focal sensory/motor deficits. Cranial nerves: II-XII intact, grossly non-focal.  Psychiatric: Alert and oriented, thought content appropriate, normal insight  Vascular: Peripheral edema improving at the bilateral lower extremities. groin. Phleboliths in the pelvis. Probable atelectasis or prominent vessels in the lung bases. No discrete lobar consolidation. Possible small right pleural effusion versus blunting of the right costophrenic angle. Cardiomegaly. Nonspecific bowel gas pattern. **This report has been created using voice recognition software. It may contain minor errors which are inherent in voice recognition technology. ** Final report electronically signed by Dr. Irasema Hawley on 7/5/2018 5:58 PM    Xr Chest Portable    Result Date: 6/28/2018  PROCEDURE: XR CHEST PORTABLE CLINICAL INFORMATION: swelling lower extremity, . COMPARISON: Juany 10, 2018 TECHNIQUE: Single portable view chest FINDINGS: Permanent left chest wall pacer. Median sternotomy. Cardiomegaly. Prominence of the interstitium and hazy opacity in the lung bases. Correlate for interstitial edema and possible small amount of pleural fluid. No acute osseous findings. Prominence of the pulmonary vessels suggesting mild pulmonary vascular congestion. Correlation advised Cardiomegaly. **This report has been created using voice recognition software. It may contain minor errors which are inherent in voice recognition technology. ** Final report electronically signed by Dr. Irasema Hawley on 6/28/2018 5:21 PM    Us Abdomen Limited    Result Date: 6/26/2018  PROCEDURE: US ABDOMEN LIMITED CLINICAL INFORMATION: Cirrhosis; ascites. COMPARISON: 6/19/2018. TECHNIQUE: Grayscale sonographic imaging of the 4 quadrants of the abdomen performed for localization of ascites. FINDINGS: 1. There is a small amount of ascites within the abdomen however this is insufficient quantity to safely perform an ultrasound-guided paracentesis. This was discussed with the patient who will return next week for her scheduled appointment. 1. There is a small amount of ascites within the abdomen however this is insufficient quantity to safely perform an ultrasound-guided paracentesis.  This was discussed

## 2018-07-15 NOTE — PROGRESS NOTES
appropriate, normal insight  Vascular: Peripheral edema improving at the bilateral lower extremities. Dependent edema at the bilateral upper extremities also improving. Induration at area of left upper extremity improved. Erythema still present at left upper extremity at elbow but continues to improve. Pain on palpation. Skin: Rash at anterior elbow region appears to be improving. Labs     Recent Labs      07/14/18   0356   WBC  8.6   HGB  9.8*   HCT  30.4*   PLT  137     Recent Labs      07/13/18   0525  07/14/18   0356  07/15/18   0422   NA  132*  132*  133*   K  3.4*  4.2  4.6   CL  92*  92*  93*   CO2  26  25  26   BUN  78*  88*  93*   CREATININE  1.8*  1.9*  2.1*   CALCIUM  8.6  8.3*  8.6     No results for input(s): AST, ALT, BILIDIR, BILITOT, ALKPHOS in the last 72 hours. Recent Labs      07/13/18   0531  07/14/18 0356  07/15/18   0422   INR  1.35*  1.54*  1.48*     Urinalysis:      Lab Results   Component Value Date    NITRU NEGATIVE 07/05/2018    WBCUA > 100 07/05/2018    BACTERIA MANY 07/05/2018    RBCUA 0-2 07/05/2018    BLOODU NEGATIVE 07/05/2018    GLUCOSEU NEGATIVE 07/05/2018       Labs reviewed and VIRA has been positive in the past.     Diagnostic imaging/procedures       Xr Acute Abd Series Chest 1 Vw    Result Date: 7/5/2018  PROCEDURE: XR ACUTE ABD SERIES CHEST 1 VW CLINICAL INFORMATION: History of cirrhosis and ascites, shortness of breath,  . COMPARISON: June 28, 2018 TECHNIQUE: A PA upright view of the chest was obtained. AP and supine views of the abdomen were obtained. FINDINGS: Chest: Permanent pacer. Median sternotomy wires. Cardiomegaly. Blunting of the right costophrenic angle. Possible opacity versus atelectasis in the left lung base. Prominent vasculature can also be a consideration. Degenerative changes thoracic spine and  both shoulders. Thoracic scoliosis. Abdomen: Nonspecific bowel gas pattern. Scattered air within bowel loops. No fluid level identified.  Degenerative changes thoracolumbar spine with scoliosis. SI joints are symmetric. Osteopenia. Surgical clips in the right groin. Phleboliths in the pelvis. Probable atelectasis or prominent vessels in the lung bases. No discrete lobar consolidation. Possible small right pleural effusion versus blunting of the right costophrenic angle. Cardiomegaly. Nonspecific bowel gas pattern. **This report has been created using voice recognition software. It may contain minor errors which are inherent in voice recognition technology. ** Final report electronically signed by Dr. Christina Severe on 7/5/2018 5:58 PM    Xr Chest Portable    Result Date: 6/28/2018  PROCEDURE: XR CHEST PORTABLE CLINICAL INFORMATION: swelling lower extremity, . COMPARISON: Juany 10, 2018 TECHNIQUE: Single portable view chest FINDINGS: Permanent left chest wall pacer. Median sternotomy. Cardiomegaly. Prominence of the interstitium and hazy opacity in the lung bases. Correlate for interstitial edema and possible small amount of pleural fluid. No acute osseous findings. Prominence of the pulmonary vessels suggesting mild pulmonary vascular congestion. Correlation advised Cardiomegaly. **This report has been created using voice recognition software. It may contain minor errors which are inherent in voice recognition technology. ** Final report electronically signed by Dr. Christina Severe on 6/28/2018 5:21 PM    Us Abdomen Limited    Result Date: 6/26/2018  PROCEDURE: US ABDOMEN LIMITED CLINICAL INFORMATION: Cirrhosis; ascites. COMPARISON: 6/19/2018. TECHNIQUE: Grayscale sonographic imaging of the 4 quadrants of the abdomen performed for localization of ascites. FINDINGS: 1. There is a small amount of ascites within the abdomen however this is insufficient quantity to safely perform an ultrasound-guided paracentesis. This was discussed with the patient who will return next week for her scheduled appointment.      1. There is a small amount of ascites within the abdomen however this is insufficient quantity to safely perform an ultrasound-guided paracentesis. This was discussed with the patient who will return next week for her scheduled appointment. **This report has been created using voice recognition software. It may contain minor errors which are inherent in voice recognition technology. ** Final report electronically signed by Dr. Shruthi Singh on 6/26/2018 12:22 PM    Us Guided Paracentesis    Result Date: 7/3/2018  PROCEDURE: US GUIDED PARACENTESIS CLINICAL INFORMATION: Cirrhosis; ascites. COMPARISON: 6/19/2018. PHYSICIAN PERFORMING PROCEDURE: Cal Head M.D. PROCEDURE:  Informed consent was obtained. Limited sonographic imaging of the abdomen was performed for localization of ascites. The skin along the lateral margin of the right upper abdomen was marked over a pocket of ascites. The puncture site was prepped and draped in a sterile fashion and locally anesthetized with 2% lidocaine. The distal tip of a 5 Cymraes one-step catheter was advanced into the ascites. An 80 ml specimen was obtained to be sent to the laboratory for analysis as per the orders of the referring physician. .  A total of 1.4 L clear yellow ascites was then aspirated and discarded. The 5 Cymraes one-step catheter was then removed. The patient tolerated the procedure well and and there were no immediate complications. 1.  Uncomplicated diagnostic and therapeutic paracentesis. **This report has been created using voice recognition software. It may contain minor errors which are inherent in voice recognition technology. ** Final report electronically signed by Dr. Shruthi Singh on 7/3/2018 3:02 PM    Us Guided Paracentesis    Result Date: 6/19/2018  PROCEDURE: US GUIDED PARACENTESIS CLINICAL INFORMATION: Ascites. COMPARISON: 6/10/2018. PHYSICIAN PERFORMING PROCEDURE: Cal Head M.D. PROCEDURE:  Informed consent was obtained.   Limited sonographic imaging of the abdomen was performed for

## 2018-07-15 NOTE — PLAN OF CARE
Problem: Falls - Risk of:  Goal: Will remain free from falls  Will remain free from falls   Outcome: Ongoing  No falls this shift. Patient is up with one assist, walker and gait belt. Bed alarm and chair alarms in use. Hourly rounding. Using call light appropriately. Problem: Risk for Impaired Skin Integrity  Goal: Tissue integrity - skin and mucous membranes  Structural intactness and normal physiological function of skin and  mucous membranes. Outcome: Ongoing  No new skin breakdown noted this shift. Encouraging patient to turn and reposition every 2 hours with pillow support. Problem: Pain:  Goal: Pain level will decrease  Pain level will decrease   Outcome: Ongoing  Pain Assessment: 0-10  Pain Level: 0   Pain goal:  No pain  Is pain goal met at this time? Yes  Pain Intervention(s): MD notified (comment)  Additional interventions to be implemented: position change      Problem: Discharge Planning:  Goal: Discharged to appropriate level of care  Discharged to appropriate level of care   Outcome: Ongoing  Patient is from home with daughter. Plan is for patient to return home with daughter at time of discharge. Problem: Physical Regulation:  Goal: Prevent transmision of infection  Prevent transmision of infection   Outcome: Ongoing  Patient remains in contact isolation. Gown and gloves being worn in room. Comments: Care plan reviewed with patient. Patient verbalize understanding of the plan of care and contribute to goal setting.

## 2018-07-15 NOTE — PROGRESS NOTES
syndrome      Subjective:   Admit Date: 7/5/2018    Interval History:   Seeing for acute kidney injury   Awake and alert   Has no concern  Feels better   No shortness of breath  Blood pressure is stable   Urine output is good         Medications:   Scheduled Meds:   furosemide  80 mg Oral BID    fentanNYL  25 mcg Intravenous Once    carvedilol  12.5 mg Oral BID WC    insulin glargine  13 Units Subcutaneous Nightly    cholestyramine light  4 g Oral Daily    allopurinol  100 mg Oral Daily    potassium chloride  40 mEq Oral BID    rOPINIRole  0.5 mg Oral Nightly    pantoprazole  40 mg Oral BID AC    apixaban  2.5 mg Oral BID    vitamin C  500 mg Oral Daily    vitamin D  1,000 Units Oral Daily    multivitamin  1 tablet Oral Daily    FLUoxetine  20 mg Oral Daily    levothyroxine  88 mcg Oral Daily    rifaximin  550 mg Oral BID    insulin lispro  0-12 Units Subcutaneous TID WC    lactulose  10 g Oral Daily    isosorbide mononitrate  30 mg Oral Daily     Continuous Infusions:   dextrose         CBC:   Recent Labs      07/14/18 0356   WBC  8.6   HGB  9.8*   PLT  137     CMP:    Recent Labs      07/13/18   0525  07/14/18   0356  07/15/18   0422   NA  132*  132*  133*   K  3.4*  4.2  4.6   CL  92*  92*  93*   CO2  26  25  26   BUN  78*  88*  93*   CREATININE  1.8*  1.9*  2.1*   GLUCOSE  133*  300*  220*   CALCIUM  8.6  8.3*  8.6   LABGLOM  27*  25*  23*     Troponin: No results for input(s): TROPONINI in the last 72 hours. BNP: No results for input(s): BNP in the last 72 hours. INR:   Recent Labs      07/13/18   0531  07/14/18 0356  07/15/18   0422   INR  1.35*  1.54*  1.48*     Lipids: No results for input(s): CHOL, LDLDIRECT, TRIG, HDL, AMYLASE, LIPASE in the last 72 hours. Liver: No results for input(s): AST, ALT, ALKPHOS, PROT, LABALBU, BILITOT in the last 72 hours. Invalid input(s): BILDIR  Iron:  No results for input(s): IRONS, FERRITIN in the last 72 hours.     Invalid input(s): LABIRONS    Objective:   Vitals: /60   Pulse 80   Temp 97.4 °F (36.3 °C) (Oral)   Resp 18   Ht 5' 5\" (1.651 m)   Wt 220 lb 6.4 oz (100 kg)   SpO2 97%   BMI 36.68 kg/m²    Wt Readings from Last 3 Encounters:   07/15/18 220 lb 6.4 oz (100 kg)   06/11/18 175 lb (79.4 kg)   06/10/18 168 lb (76.2 kg)      24HR INTAKE/OUTPUT:      Intake/Output Summary (Last 24 hours) at 07/15/18 1023  Last data filed at 07/15/18 0703   Gross per 24 hour   Intake             1233 ml   Output             1450 ml   Net             -217 ml       Constitutional:  Alert, awake, no apparent distress  Skin:normal   HEENT:Pupils are reactive . Throat is clear   Neck:supple with no thyromegaly  Cardiovascular:  S1, S2 without murmur  Respiratory:  Decreased sound bases   Abdomen: +bs, soft, non tender  Ext: ++ LE edema  Musculoskeletal:Intact  Neuro:Alert ,awake and oriented with no deficit      Electronically signed by Bishop Salomon MD on 7/15/2018 at 10:23 AM

## 2018-07-16 NOTE — PROGRESS NOTES
Formulation and discussion of sedation / procedure plans, risks, benefits, side effects and alternatives with patient and/or responsible adult completed.     Electronically signed by Shoshana Martinez MD on 7/16/2018 at 9:09 AM

## 2018-07-16 NOTE — DISCHARGE SUMMARY
Hospital Medicine Discharge Summary      Patient:  Rozina Maldonado  YOB: 1940  MRN: [id number]   PCP: Janis Lewis, APRN - CNP         Acct: [de-identified]     Admit Date: 7/5/2018     Discharge Date: 7/16/2018      Admitting Physician: Yun Barth MD  Discharge Physician: Celena Restrepo MD     Discharge Diagnoses     DISCHARGE DIAGNOSES:  1. Anasarca secondary to a combination of acute on chronic systolic congestive heart failure and cirrhosis of the liver with ascites and right sided pleural effusion, recalcitrant to oral diuretics s/p paracentesis with removal of 1.76 liters of fluid on 07/11/2018, s/p paracentesis on 07/16 with removal of 2.1 liters of fluid. 2. Acute kidney injury on chronic kidney disease stage 3.  3. Klebsiella ozaena cystitis. 4. Hemorrhoidal bleed. 5. Drug induced hypotension  6. Skin Irritation at left upper extremity likely bruising, doubt infection as patient currently on Ceftriaxone. 7. Abdominal pruritus  8. Hyponatremia  9. Hypokalemia  10. Hyperkalemia  11. Restless legs syndrome  12. Chronic co-morbidities:  1. Chronic kidney disease Stage 3  2. Paroxysymal atrial fibrillation on anticoagulation  3. Essential hypertension  4. Hypothyroidism  5. Positive VIRA  6. History of CVA  7. Diabetes mellitus Type 2    Disposition:    [x] Home       [] TCU       [] Rehab       [] Psych       [] SNF       [] [place]       [] Other-    Condition at Discharge: Stable and improved. The patient was seen and examined on day of discharge and this discharge summary is in conjunction with any daily progress note from day of discharge.     Hospital course     Rozina Maldonado is a 76 y.o. female with a history of chronic respiratory failure on 3 liters of oxygen, cirrhosis, chronic systolic congestive heart failure (EF of 15%), atrial fibrillation on apixaban who was admitted to [facility] on  7/5/2018 for a 3 week history of progressively worsening fluid accumulation associated with weight gain of reporte 50 lbs. Anasarca secondary to a combination of acute on chronic systolic congestive heart failure and cirrhosis of the liver with ascites and right sided pleural effusion, recalcitrant to oral diuretics s/p paracentesis with removal of 1.76 liters of fluid on 07/11/2018  The patient was on furosemide 20 mg daily on admission. CXR showed increased pulmonary vascularity with probable atelactasis or prominent vessels in the lung bases, with no discrete lobar consolidation with possible small right pleural effusion. She was started on a Dobutrex drip with Bumex at 1mg/hr and transitioned to Lasix 80 mg IV bid and then 80 mg bid on discharge. She is also s/p paracentesis on 07/11 with removal of 1.76 liters of fluid. Paracentesis was also obtained on 07/16 prior to discharge. Palliative Care met with the patient and she recognizes that she has end stage cirrhosis and heart disease. Cardiology Dr. FIFI DEAL [facility] was consulted and affirmed that the patient is end stage heart failure with poor prognosis. The patient is to be discharged on bumex 1 mg bid for continued diruesis. Acute kidney injury on chronic kidney disease stage 3  The patient's creatinine was 1.5  at the time of admission but continued to trend upwards to 2.0. Nephrology was consulted. Dr. India Sanchez believes that her kidney function will improve. Klebsiella Ozaena cystitis. On 07/05 a urinalysis was performed and culture eventually showed Klebsiella ozaenae sensitive to Ceftriaxone. The patient was treated and completed a 7 day course of antibiotics in the hospital    Hemorrhoidal bleed. The patient was noted to have rectal bleeding in the setting a history of hemorrhoids. Gastroenterology, Dr. Manish Chavez, Gastreoenterology was mistakenly consulted and recommended colonoscopy however her regular gastroenterologist, Dr. Luís Vital did not recommend any further interventions. tablet  Commonly known as:  ATARAX  Take 1 tablet by mouth 3 times daily as needed for Itching     potassium chloride 20 MEQ extended release tablet  Commonly known as:  KLOR-CON M  Take 1 tablet by mouth daily     rifaximin 550 MG tablet  Commonly known as:  XIFAXAN  Take 1 tablet by mouth 2 times daily     rOPINIRole 0.5 MG tablet  Commonly known as:  REQUIP  Take 1 tablet by mouth nightly        CHANGE how you take these medications    carvedilol 12.5 MG tablet  Commonly known as:  COREG  Take 1 tablet by mouth 2 times daily (with meals)  What changed:  · medication strength  · how much to take     digoxin 125 MCG tablet  Commonly known as:  LANOXIN  Take 1 tablet by mouth three times a week  What changed:  additional instructions     insulin glargine 100 UNIT/ML injection pen  Commonly known as:  LANTUS SOLOSTAR  Inject 15 Units into the skin nightly  What changed:  how much to take     insulin lispro 100 UNIT/ML injection vial  Commonly known as:  HUMALOG  Inject 0-12 Units into the skin 3 times daily (with meals) Use sliding scale 3 times daily with meals as follows: <139 -No Insulin; 140-199 2 Units; 200-249 4 Units; 250-299 6 Units; 300-349 8 Units; 350-400 10 Units; Above 400 12 Units: ok to sub kwikpen.   What changed:  · how much to take  · how to take this  · when to take this  · additional instructions        CONTINUE taking these medications    allopurinol 100 MG tablet  Commonly known as:  ZYLOPRIM     amitriptyline 50 MG tablet  Commonly known as:  ELAVIL     apixaban 2.5 MG Tabs tablet  Commonly known as:  ELIQUIS  Take 1 tablet by mouth 2 times daily     aspirin 81 MG chewable tablet     cholestyramine light 4 g packet  Take 1 packet by mouth daily     FISH OIL-KRILL OIL PO     FLUoxetine 20 MG capsule  Commonly known as:  PROZAC     glipiZIDE 5 MG tablet  Commonly known as:  GLUCOTROL     Insulin Pen Needle 31G X 5 MM Misc  1 each by Does not apply route 4 times daily     isosorbide mononitrate 30 07/06/2018           Radiology      Xr Acute Abd Series Chest 1 Vw    Result Date: 7/5/2018  PROCEDURE: XR ACUTE ABD SERIES CHEST 1 VW CLINICAL INFORMATION: History of cirrhosis and ascites, shortness of breath,  . COMPARISON: June 28, 2018 TECHNIQUE: A PA upright view of the chest was obtained. AP and supine views of the abdomen were obtained. FINDINGS: Chest: Permanent pacer. Median sternotomy wires. Cardiomegaly. Blunting of the right costophrenic angle. Possible opacity versus atelectasis in the left lung base. Prominent vasculature can also be a consideration. Degenerative changes thoracic spine and  both shoulders. Thoracic scoliosis. Abdomen: Nonspecific bowel gas pattern. Scattered air within bowel loops. No fluid level identified. Degenerative changes thoracolumbar spine with scoliosis. SI joints are symmetric. Osteopenia. Surgical clips in the right groin. Phleboliths in the pelvis. Probable atelectasis or prominent vessels in the lung bases. No discrete lobar consolidation. Possible small right pleural effusion versus blunting of the right costophrenic angle. Cardiomegaly. Nonspecific bowel gas pattern. **This report has been created using voice recognition software. It may contain minor errors which are inherent in voice recognition technology. ** Final report electronically signed by Dr. Vidhi Enciso on 7/5/2018 5:58 PM    Xr Chest Portable    Result Date: 6/28/2018  PROCEDURE: XR CHEST PORTABLE CLINICAL INFORMATION: swelling lower extremity, . COMPARISON: Juany 10, 2018 TECHNIQUE: Single portable view chest FINDINGS: Permanent left chest wall pacer. Median sternotomy. Cardiomegaly. Prominence of the interstitium and hazy opacity in the lung bases. Correlate for interstitial edema and possible small amount of pleural fluid. No acute osseous findings. Prominence of the pulmonary vessels suggesting mild pulmonary vascular congestion. Correlation advised Cardiomegaly.  **This report has been created using voice recognition software. It may contain minor errors which are inherent in voice recognition technology. ** Final report electronically signed by Dr. Alvino Blanton on 6/28/2018 5:21 PM    Us Abdomen Limited    Result Date: 6/26/2018  PROCEDURE: US ABDOMEN LIMITED CLINICAL INFORMATION: Cirrhosis; ascites. COMPARISON: 6/19/2018. TECHNIQUE: Grayscale sonographic imaging of the 4 quadrants of the abdomen performed for localization of ascites. FINDINGS: 1. There is a small amount of ascites within the abdomen however this is insufficient quantity to safely perform an ultrasound-guided paracentesis. This was discussed with the patient who will return next week for her scheduled appointment. 1. There is a small amount of ascites within the abdomen however this is insufficient quantity to safely perform an ultrasound-guided paracentesis. This was discussed with the patient who will return next week for her scheduled appointment. **This report has been created using voice recognition software. It may contain minor errors which are inherent in voice recognition technology. ** Final report electronically signed by Dr. Emerita Wynne on 6/26/2018 12:22 PM    Us Guided Paracentesis    Result Date: 7/11/2018  PROCEDURE: Ultrasound-guided paracentesis. CLINICAL INFORMATION: Ascites. COMPARISON: Paracentesis dated July 3, 2018 PROCEDURE: The benefits, alternatives and the risks of the procedure were discussed with the patient. Verbal and signed consent was obtained. A timeout was performed to confirm the correct patient, procedure and site. Limited ultrasound exam of the abdomen showed a small amount of ascites. The right side of the abdomen was prepped and draped using the usual sterile technique and the skin was anesthetized with 2% local lidocaine. A 5 [place] one-step catheter was introduced to the peritoneal ascites. Return was clear , yellow fluid. No lab specimen requested.  A total of approximately 1.76 L of fluid drained and discarded. The catheter was then removed. The patient tolerated the procedure well with no complications reported and was discharged from the radiology department in a stable condition. Ultrasound-guided paracentesis. Approximately, 1.76 L of fluid drained. **This report has been created using voice recognition software. It may contain minor errors which are inherent in voice recognition technology. ** Final report electronically signed by Dr. Be Poe on 7/11/2018 5:29 PM    Us Guided Paracentesis    Result Date: 7/3/2018  PROCEDURE: US GUIDED PARACENTESIS CLINICAL INFORMATION: Cirrhosis; ascites. COMPARISON: 6/19/2018. PHYSICIAN PERFORMING PROCEDURE: Kumar Juan M.D. PROCEDURE:  Informed consent was obtained. Limited sonographic imaging of the abdomen was performed for localization of ascites. The skin along the lateral margin of the right upper abdomen was marked over a pocket of ascites. The puncture site was prepped and draped in a sterile fashion and locally anesthetized with 2% lidocaine. The distal tip of a 5 [place] one-step catheter was advanced into the ascites. An 80 ml specimen was obtained to be sent to the laboratory for analysis as per the orders of the referring physician. .  A total of 1.4 L clear yellow ascites was then aspirated and discarded. The 5 [place] one-step catheter was then removed. The patient tolerated the procedure well and and there were no immediate complications. 1.  Uncomplicated diagnostic and therapeutic paracentesis. **This report has been created using voice recognition software. It may contain minor errors which are inherent in voice recognition technology. ** Final report electronically signed by Dr. Rusty Lipscomb on 7/3/2018 3:02 PM    Us Guided Paracentesis    Result Date: 6/19/2018  PROCEDURE: US GUIDED PARACENTESIS CLINICAL INFORMATION: Ascites. COMPARISON: 6/10/2018. PHYSICIAN PERFORMING PROCEDURE: Kumar Juan M.D.  PROCEDURE: Informed consent was obtained. Limited sonographic imaging of the abdomen was performed for localization of ascites. The skin along the lateral margin of the right upper abdomen was marked over a pocket of ascites. The puncture site was prepped and draped in a sterile fashion and locally anesthetized with 2% lidocaine. The distal tip of a 5 Citizen of [place] one-step catheter was advanced into the ascites. An 80 ml specimen was obtained to be sent to the laboratory for analysis as per the orders of the referring caregiver. Only 50 mL of clear yellow ascites could be aspirated. The 5 Citizen [place] one-step catheter was then removed. The skin along the lateral margin of the right lower abdomen was then marked over a pocket of ascites. The puncture site was prepped and draped in a sterile fashion and locally anesthetized with 2% lidocaine. The distal tip of a 5 Citizen [place] one-step catheter was advanced into the ascites. An additional 2.45 L of clear yellow ascites was aspirated. The 5 [place] one-step catheter was then removed. The patient tolerated the procedure well and and there were no immediate complications. 1.  Uncomplicated diagnostic and therapeutic paracentesis. 2. A total of 2.5 L of clear yellow ascites was aspirated. 3. An 80 mL specimen was obtained to be sent to the laboratory for analysis as per the orders of the referring caregiver. **This report has been created using voice recognition software. It may contain minor errors which are inherent in voice recognition technology. ** Final report electronically signed by Dr. Emerita Wynne on 6/19/2018 1:27 PM        Consults     STR ED TO IP CONSULT  IP CONSULT TO GI  IP CONSULT TO SOCIAL WORK  PALLIATIVE CARE EVAL  IP CONSULT TO GI  IP CONSULT TO DIETITIAN          Discharge Instructions     Patient Instructions:    Discharge lab work: [facility] on 07/18/2018  Activity: As tolerated  Diet: Dietary Nutrition Supplements: Diabetic Oral Supplement  DIET CARDIAC; Carb Control: 4 carb

## 2018-07-16 NOTE — PLAN OF CARE
Problem: Falls - Risk of:  Goal: Will remain free from falls  Will remain free from falls   Outcome: Ongoing  Pt up with 1 assist and a walker. Uses slippers while up. Bed alarm on. Uses call light appropriately. Free of falls this shift. Problem: Risk for Impaired Skin Integrity  Goal: Tissue integrity - skin and mucous membranes  Structural intactness and normal physiological function of skin and  mucous membranes. Outcome: Ongoing  Some skin issues- see flowsheet. Able to change positions while in bed, encouraged to turn every 2 hours    Problem: Pain:  Goal: Pain level will decrease  Pain level will decrease   Outcome: Met This Shift  Pain Assessment: 0-10  Pain Level: 0   Pain goal:  No pain  Is pain goal met at this time? Yes  Pain Intervention(s): MD notified (comment)  Additional interventions to be implemented: position change and rest    Problem: Discharge Planning:  Goal: Discharged to appropriate level of care  Discharged to appropriate level of care   Outcome: Ongoing  Plan to discharge home tomorrow with daughter after paracentesis. Comments: Care plan reviewed with patient. Patient verbalize understanding of the plan of care and contribute to goal setting.

## 2018-07-16 NOTE — CARE COORDINATION
7/16/18, 1:55 PM    DISCHARGE BARRIERS      SW notified Brandon Matta with [facility] regarding discharge for today.   Please see full DOD completed on July 13

## 2018-07-16 NOTE — DISCHARGE INSTR - DIET
 Good nutrition is important when healing from an illness, injury, or surgery. Follow any nutrition recommendations given to you during your hospital stay.  If you were given an oral nutrition supplement while in the hospital, continue to take this supplement at home. You can take it with meals, in-between meals, and/or before bedtime. These supplements can be purchased at most local grocery stores, pharmacies, and chain super-stores.  If you have any questions about your diet or nutrition, call the hospital and ask for the dietitian. You can have 1800 ml fluid restriction. Diabetic diet. You are being placed on a diabetic carb counting diet. Eating healthy is the first step in controlling diabetes    Here's how to get started. ... Eat 3 meals a day. Eat your meals at the same time each day and do not skip meals. Eat about the same amount of food each day. Limit sugar and sweets. Eat less candy, desserts, pastries and jelly. Limit intake of regular pop, sugary beverages and fruit juice. Drink sugar free beverages such as diet pop, water, Crystal Light, and unsweetened tea instead. Use Equal or Sweet-n-Low in place of sugar. Lose weight if you are overweight. Even a small amount of weight loss may help improve your blood sugar control. To help lose weight, reduce your portion sizes. Control your intake of carbohydrates. Carbohydrate is the main  nutrient that affects blood sugar levels. All the carbohydrate you eat is turned  into sugar by your body. Therefore, it is important to control  the amount  of carbohydrate that you eat a day. You should eat about 60-75  grams of  carbohydrate at each meal.      Common sources of carbohydrates:   ? Fruit and fruit juice  ? Bread, cereal, pasta, rice  ? Starchy vegetables (potatoes, corn, peas)  ? Milk, yogurt, pudding  ? Beans and lentils  ? Desserts, candy, ice cream, doughnuts and pastries    Eat more fiber.   Fiber can help

## 2018-07-24 NOTE — PROGRESS NOTES
Department of Radiology  Post Procedure Progress Note      Pre-Procedure Diagnosis:  Ascites    Procedure Performed:  US guided Paracentesis    Anesthesia: local    Findings: successful    Immediate Complications:  None    Estimated Blood Loss: minimal    SEE DICTATED PROCEDURE NOTE FOR COMPLETE DETAILS.     Amrit Sparks MD   7/24/2018 3:34 PM

## 2018-07-31 NOTE — BRIEF OP NOTE
Post Procedure Progress Note    7/31/2018  Pre-Procedure Diagnosis: Ascites  Post-Procedure Diagnosis: Same  Physician: Queen Saranya MD  Anesthesia: 2% lidocaine local  Procedure Performed: Ultrasound guided paracentesis  Specimen Removed: 1.7 liters clear yellow  Disposition of Specimen: 80 ml specimen sent to the lab  Estimated Blood Loss: None  Complications: None

## 2018-08-06 NOTE — PROGRESS NOTES
[facility]        Outpatient Follow-Up note         8/6/2018 2:59 PM    Patient Name:   Roderick Dense:    1940  Primary Care Physician:  LEMUEL Mcclure CNP     Chief Complaint / Reason for follow-up : Follow Up of CKD and fluid overload     Interval History :  Patient seen and examined by me. Feels ok. No distress. No cp or SOB. Patient was recently in the hospital for fluid overload at that point we also place her on Dobutrex and also Bumex drip. She has lost significant weight she almost urinated more than 8 L per day at that time. She is normally a patient of Dr. Preeti Augustine     Past History :  Past Medical History:   Diagnosis Date    Atrial fibrillation ([address]     Cerebral artery occlusion with cerebral infarction ([address]     CHF (congestive heart failure) ([address]     Cirrhosis ([address]     Diabetes mellitus ([address]     Hypertension     Hypothyroid     Pneumonia     Thyroid disease      Past Surgical History:   Procedure Laterality Date    CARDIAC SURGERY      PACEMAKER INSERTION      VASCULAR SURGERY          Medications :     Outpatient Prescriptions Marked as Taking for the 8/6/18 encounter (Office Visit) with Mckenzie Flores MD   Medication Sig Dispense Refill    allopurinol (ZYLOPRIM) 100 MG tablet Take 100 mg by mouth daily      vitamin E 100 units capsule Take 100 Units by mouth daily      insulin glargine (LANTUS SOLOSTAR) 100 UNIT/ML injection pen Inject 15 Units into the skin nightly 5 pen 3    bumetanide (BUMEX) 1 MG tablet Take 1 tablet by mouth 2 times daily 30 tablet 3    insulin lispro (HUMALOG) 100 UNIT/ML injection vial Inject 0-12 Units into the skin 3 times daily (with meals) Use sliding scale 3 times daily with meals as follows: <139 -No Insulin; 140-199 2 Units; 200-249 4 Units; 250-299 6 Units; 300-349 8 Units; 350-400 10 Units; Above 400 12 Units: ok to sub kwikpen.  10 mL 3    hydrocortisone (ANUSOL-HC) 25 MG suppository

## 2018-08-07 NOTE — BRIEF OP NOTE
Post Procedure Progress Note    8/7/2018  Pre-Procedure Diagnosis: Ascites  Post-Procedure Diagnosis: Same  Physician: Candace Medrano MD  Anesthesia: 2% lidocaine local  Procedure Performed: Ultrasound guided paracentesis  Specimen Removed: 0.9 liters clear yellow ascites  Disposition of Specimen: 80 ml   Estimated Blood Loss: None  Complications: None

## 2018-08-21 NOTE — BRIEF OP NOTE
Post Procedure Progress Note    8/21/2018  Pre-Procedure Diagnosis: Ascites  Post-Procedure Diagnosis: Same  Physician: Franck Sosa MD  Anesthesia: 2% lidocaine local  Procedure Performed: Ultrasound guided paracentesis  Specimen Removed: 1.5 liters clear yellow ascites  Disposition of Specimen: 80 ml specimen sent to the lab  Estimated Blood Loss: None  Complications: None

## 2018-08-21 NOTE — PROGRESS NOTES
Formulation and discussion of sedation / procedure plans, risks, benefits, side effects and alternatives with patient and/or responsible adult completed.     Electronically signed by Inez Mead MD on 8/21/2018 at 12:00 PM

## 2018-08-22 NOTE — ED PROVIDER NOTES
[facility]  eMERGENCY dEPARTMENT eNCOUnter          CHIEF COMPLAINT       Chief Complaint   Patient presents with    Arm Pain     left       Nurses Notes reviewed and I agree except as noted in the HPI. HISTORY OF PRESENT ILLNESS    Elle Aguilera is a [de-identified] y.o. female who presents to the Emergency Department for the evaluation of left arm pain. Patient states that she has been having left upper mid arm pain for the past couple of days. Patient states that she was sitting in living room when she began to have radiation of left upper arm pain to left elbow. She states that her pain is a \"heaviness\". She states that her left hand has been swollen. She states that her hand has been swollen for \"weeks. \" Denies trauma or injuries. She states that her pain is moderate in severity. Patient denies any fevers, chills, nausea, vomiting or diarrhea. Patient denies any chest pain or shortness of breath. Patient wears 3 L O2 nasal cannula at home all the time. REVIEW OF SYSTEMS     Review of Systems   Constitutional: Negative for appetite change, chills, fatigue and fever. HENT: Negative for congestion, ear pain, rhinorrhea and sore throat. Eyes: Negative for pain, discharge and visual disturbance. Respiratory: Negative for cough, shortness of breath and wheezing. Cardiovascular: Negative for chest pain, palpitations and leg swelling. Gastrointestinal: Negative for abdominal pain, diarrhea, nausea and vomiting. Genitourinary: Negative for difficulty urinating, dysuria, hematuria and vaginal discharge. Musculoskeletal: Positive for arthralgias and myalgias. Negative for back pain, joint swelling and neck pain. Skin: Negative for pallor and rash. Neurological: Negative for dizziness, syncope, weakness, light-headedness, numbness and headaches. Hematological: Negative for adenopathy. Psychiatric/Behavioral: Negative for confusion and suicidal ideas.  The patient is not nervous/anxious. PAST MEDICAL HISTORY    has a past medical history of Atrial fibrillation ([facility] Cerebral artery occlusion with cerebral infarction [facility]); [facility]congest[facility] Cirrhosis ([facility] Diabetes mellitus ([facility] Hypertension; Hypothyroid; Pneumonia; and Thyroid disease. SURGICAL HISTORY      has a past surgical history that includes vascular surgery; Cardiac surgery; and Pacemaker insertion. CURRENT MEDICATIONS       Previous Medications    ALLOPURINOL (ZYLOPRIM) 100 MG TABLET    Take 100 mg by mouth daily    APIXABAN (ELIQUIS) 2.5 MG TABS TABLET    Take 1 tablet by mouth 2 times daily    B COMPLEX-C (SUPER B COMPLEX PO)    Take 1 tablet by mouth daily    BUMETANIDE (BUMEX) 1 MG TABLET    Take 1 tablet by mouth 2 times daily    CARVEDILOL (COREG) 12.5 MG TABLET    Take 1 tablet by mouth 2 times daily (with meals)    CHOLESTYRAMINE LIGHT 4 G PACKET    Take 1 packet by mouth daily    DIGOXIN (LANOXIN) 125 MCG TABLET    Take 1 tablet by mouth three times a week    FISH OIL-KRILL OIL PO    Take 1 capsule by mouth daily    FLUOXETINE (PROZAC) 20 MG CAPSULE    Take 20 mg by mouth daily    GLIPIZIDE (GLUCOTROL) 5 MG TABLET    Take 10 mg by mouth 2 times daily (before meals)     HYDROCORTISONE (ANUSOL-HC) 25 MG SUPPOSITORY    Place 1 suppository rectally 2 times daily    INSULIN GLARGINE (LANTUS SOLOSTAR) 100 UNIT/ML INJECTION PEN    Inject 15 Units into the skin nightly    INSULIN LISPRO (HUMALOG) 100 UNIT/ML INJECTION VIAL    Inject 0-12 Units into the skin 3 times daily (with meals) Use sliding scale 3 times daily with meals as follows: <139 -No Insulin; 140-199 2 Units; 200-249 4 Units; 250-299 6 Units; 300-349 8 Units; 350-400 10 Units; Above 400 12 Units: ok to sub kwikpen.     INSULIN PEN NEEDLE 31G X 5 MM MISC    1 each by Does not apply route 4 times daily    ISOSORBIDE MONONITRATE (IMDUR) 30 MG EXTENDED RELEASE TABLET    Take 1 tablet by mouth daily    LACTULOSE (CHRONULAC) 10 GM/15ML SOLUTION    Take 10 g by mouth daily    MULTIPLE VITAMIN (TAB-A-SADA PO)    Take 1 tablet by mouth daily    POTASSIUM CHLORIDE (KLOR-CON M) 20 MEQ EXTENDED RELEASE TABLET    Take 1 tablet by mouth daily    ROPINIROLE (REQUIP) 0.5 MG TABLET    Take 1 tablet by mouth nightly    SPIRONOLACTONE (ALDACTONE) 100 MG TABLET    Take 100 mg by mouth daily    THYROID (ARMOUR) 60 MG TABLET    Take 60 mg by mouth daily    TRAZODONE (DESYREL) 50 MG TABLET    Take 0.5 tablets by mouth nightly as needed for Sleep    VITAMIN C (ASCORBIC ACID) 500 MG TABLET    Take 500 mg by mouth daily    VITAMIN D (CHOLECALCIFEROL) 1000 UNIT TABS TABLET    Take 1,000 Units by mouth daily    VITAMIN E 100 UNITS CAPSULE    Take 100 Units by mouth daily       ALLERGIES     is allergic to codeine; other; and statins. FAMILY HISTORY     indicated that the status of her mother is unknown. She indicated that the status of her father is unknown. She indicated that the status of her maternal aunt is unknown. She indicated that the status of her neg hx is unknown.    family history includes Cancer in her father; Diabetes in her maternal aunt; Stroke in her mother. SOCIAL HISTORY      reports that she has never smoked. She has never used smokeless tobacco. She reports that she does not drink alcohol or use drugs. PHYSICAL EXAM     INITIAL VITALS:  height is 5' 5\" (1.651 m) and weight is 219 lb (99.3 kg). Her oral temperature is 98.7 °F (37.1 °C). Her blood pressure is 108/53 (abnormal) and her pulse is 77. Her respiration is 18 and oxygen saturation is 97%. Physical Exam   Constitutional: She is oriented to person, place, and time. She appears well-developed and well-nourished. Non-toxic appearance. HENT:   Head: Normocephalic and atraumatic.    Right Ear: Tympanic membrane and external ear normal.   Left Ear: Tympanic membrane and external ear normal.   Nose: Nose normal.   Mouth/Throat: Oropharynx is clear and moist and mucous Neurological: She is alert and oriented to person, place, and time. She exhibits normal muscle tone. Coordination normal.   Skin: Skin is warm and dry. No rash noted. She is not diaphoretic. Nursing note and vitals reviewed. good pulses and capillary refilling. Intact sensation. 5/5 muscle power    DIFFERENTIAL DIAGNOSIS:   Including but not limited to DVT, fracture, PAD, arthritis, rotator cuff injury. DIAGNOSTIC RESULTS     EKG: All EKG's are interpreted by the Emergency Department Physician who either signs or Co-signs this chart in the absence of a cardiologist.  None      RADIOLOGY: non-plain film images(s) such as CT, Ultrasound and MRI are read by the radiologist.    VL DUP UPPER EXTREMITY VENOUS LEFT   Final Result   No evidence of left upper extremity DVT. Incidental note is made of occluded distal forearm radial artery with collateral/are reconstituted vessels distally in the wrist.            **This report has been created using voice recognition software. It may contain minor errors which are inherent in voice recognition technology. **      Final report electronically signed by Dr. Tamika Marcelo on 8/22/2018 3:14 PM      XR HUMERUS LEFT (MIN 2 VIEWS)   Final Result   No acute fracture or dislocation. **This report has been created using voice recognition software. It may contain minor errors which are inherent in voice recognition technology. **      Final report electronically signed by Dr. Tamika Marcelo on 8/22/2018 1:59 PM            LABS:   Labs Reviewed   CBC WITH AUTO DIFFERENTIAL - Abnormal; Notable for the following:        Result Value    RBC 3.37 (*)     Hemoglobin 9.4 (*)     Hematocrit 29.4 (*)     MCHC 32.0 (*)     RDW-CV 18.5 (*)     RDW-SD 59.2 (*)     Platelets 456 (*)     All other components within normal limits   BASIC METABOLIC PANEL - Abnormal; Notable for the following:     Chloride 97 (*)     Glucose 317 (*)     BUN 72 (*)     CREATININE 1.6 (*)     All other

## 2022-07-26 NOTE — PROGRESS NOTES
Hospitalist Progress Note    Patient:  Jesus Alberto Reyes      Unit/Bed:4-12/012-A    YOB: 1940    MRN: [id number]       Acct: [de-identified]     PCP: LEMUEL Artis CNP    Date of Admission: 7/5/2018    Chief Complaint:   Chief Complaint   Patient presents with    Other     swelling all over       Subjective:   Denies dyspnea at this time. LE edema improving. Good UOP  Cr 1.4. Medications:  Reviewed    Infusion Medications    DOBUTamine 2.5 mcg/kg/min (07/09/18 0412)    furosemide (LASIX) 5mg/ml infusion 5 mg/hr (07/08/18 2034)    dextrose       Scheduled Medications    cefTRIAXone (ROCEPHIN) IV  1 g Intravenous Q24H    vitamin C  500 mg Oral Daily    vitamin D  1,000 Units Oral Daily    multivitamin  1 tablet Oral Daily    FLUoxetine  20 mg Oral Daily    cholestyramine light  4 g Oral Daily    insulin glargine  10 Units Subcutaneous Nightly    levothyroxine  88 mcg Oral Daily    apixaban  2.5 mg Oral BID    rifaximin  550 mg Oral BID    insulin lispro  0-12 Units Subcutaneous TID WC    lactulose  10 g Oral Daily    isosorbide mononitrate  30 mg Oral Daily    carvedilol  25 mg Oral BID    pantoprazole  40 mg Intravenous Daily     PRN Meds: traZODone, glucose, dextrose, glucagon (rDNA), dextrose      Intake/Output Summary (Last 24 hours) at 07/09/18 1510  Last data filed at 07/09/18 0544   Gross per 24 hour   Intake           777.18 ml   Output             2150 ml   Net         -1372.82 ml       Diet:  DIET CARDIAC; Carb Control: 4 carb choices (60 gms)/meal; Dental Soft; Daily Fluid Restriction: 1500 ml  Dietary Nutrition Supplements: Renal Oral Supplement    Exam:  BP (!) 143/64   Pulse 66   Temp 98.4 °F (36.9 °C) (Oral)   Resp 18   Ht 5' 5\" (1.651 m)   Wt 215 lb 6.4 oz (97.7 kg)   SpO2 98%   BMI 35.84 kg/m²     Gen/overall appearance: Not in acute distress. Alert.   Head: Normocephalic, atraumatic  Eyes: EOMI, no scleral icterus  CVS: regular rate and rhythm, Normal S1S2  Pulm: Clear to auscultation bilaterally. No crackles/wheezes, diminished in bases  Gastrointestinal: Soft, nontender, obese, no guarding or rebound  Extremities: bilat 1+ edema. No erythema or warmth  Neuro: No gross focal deficits noted     Labs:   No results for input(s): WBC, HGB, HCT, PLT in the last 72 hours. Recent Labs      07/07/18   0546   07/08/18   0714  07/08/18   1755  07/09/18   0555   NA  135   --   137   --   137   K  4.5   < >  4.3  4.2  4.3   CL  105   --   101   --   102   CO2  20*   --   19*   --   22*   BUN  52*   --   55*   --   59*   CREATININE  1.3*   --   1.5*   --   1.4*   CALCIUM  8.6   --   8.5   --   8.2*    < > = values in this interval not displayed. No results for input(s): AST, ALT, BILIDIR, BILITOT, ALKPHOS in the last 72 hours. No results for input(s): INR in the last 72 hours. No results for input(s): Audrey Sneddon in the last 72 hours. Urinalysis:      Lab Results   Component Value Date    NITRU NEGATIVE 07/05/2018    WBCUA > 100 07/05/2018    BACTERIA MANY 07/05/2018    RBCUA 0-2 07/05/2018    BLOODU NEGATIVE 07/05/2018    GLUCOSEU NEGATIVE 07/05/2018       Radiology:  Xr Acute Abd Series Chest 1 Vw    Result Date: 7/5/2018  PROCEDURE: XR ACUTE ABD SERIES CHEST 1 VW CLINICAL INFORMATION: History of cirrhosis and ascites, shortness of breath,  . COMPARISON: June 28, 2018 TECHNIQUE: A PA upright view of the chest was obtained. AP and supine views of the abdomen were obtained. FINDINGS: Chest: Permanent pacer. Median sternotomy wires. Cardiomegaly. Blunting of the right costophrenic angle. Possible opacity versus atelectasis in the left lung base. Prominent vasculature can also be a consideration. Degenerative changes thoracic spine and  both shoulders. Thoracic scoliosis. Abdomen: Nonspecific bowel gas pattern. Scattered air within bowel loops. No fluid level identified. Degenerative changes thoracolumbar spine with scoliosis. SI joints are symmetric. paracentesis. **This report has been created using voice recognition software. It may contain minor errors which are inherent in voice recognition technology. ** Final report electronically signed by Dr. Hanny Flores on 7/3/2018 3:02 PM    Us Guided Paracentesis    Result Date: 6/19/2018  PROCEDURE: US GUIDED PARACENTESIS CLINICAL INFORMATION: Ascites. COMPARISON: 6/10/2018. PHYSICIAN PERFORMING PROCEDURE: Lilly Littlejohn M.D. PROCEDURE:  Informed consent was obtained. Limited sonographic imaging of the abdomen was performed for localization of ascites. The skin along the lateral margin of the right upper abdomen was marked over a pocket of ascites. The puncture site was prepped and draped in a sterile fashion and locally anesthetized with 2% lidocaine. The distal tip of a 5 [place] one-step catheter was advanced into the ascites. An 80 ml specimen was obtained to be sent to the laboratory for analysis as per the orders of the referring caregiver. Only 50 mL of clear yellow ascites could be aspirated. The 5 [place] one-step catheter was then removed. The skin along the lateral margin of the right lower abdomen was then marked over a pocket of ascites. The puncture site was prepped and draped in a sterile fashion and locally anesthetized with 2% lidocaine. The distal tip of a 5 [place] one-step catheter was advanced into the ascites. An additional 2.45 L of clear yellow ascites was aspirated. The 5 [place] one-step catheter was then removed. The patient tolerated the procedure well and and there were no immediate complications. 1.  Uncomplicated diagnostic and therapeutic paracentesis. 2. A total of 2.5 L of clear yellow ascites was aspirated. 3. An 80 mL specimen was obtained to be sent to the laboratory for analysis as per the orders of the referring caregiver. **This report has been created using voice recognition software. It may contain minor errors which are inherent in voice recognition technology. ** Final High Dose Vitamin A Counseling: Side effects reviewed, pt to contact office should one occur.